# Patient Record
Sex: FEMALE | Race: WHITE | NOT HISPANIC OR LATINO | Employment: OTHER | URBAN - METROPOLITAN AREA
[De-identification: names, ages, dates, MRNs, and addresses within clinical notes are randomized per-mention and may not be internally consistent; named-entity substitution may affect disease eponyms.]

---

## 2018-02-09 LAB — CRC RECOMMENDATION EXT: NORMAL

## 2018-06-04 ENCOUNTER — HISTORICAL (OUTPATIENT)
Dept: LAB | Facility: HOSPITAL | Age: 58
End: 2018-06-04

## 2018-06-04 LAB
ABS NEUT (OLG): 4.4 X10(3)/MCL (ref 2.1–9.2)
ALBUMIN SERPL-MCNC: 3.6 GM/DL (ref 3.4–5)
ALBUMIN/GLOB SERPL: 1 RATIO (ref 1.1–2)
ALP SERPL-CCNC: 80 UNIT/L (ref 46–116)
ALT SERPL-CCNC: 38 UNIT/L (ref 12–78)
AST SERPL-CCNC: 27 UNIT/L (ref 15–37)
BASOPHILS NFR BLD AUTO: 1 % (ref 0–2)
BILIRUB SERPL-MCNC: 0.4 MG/DL (ref 0.2–1)
BILIRUBIN DIRECT+TOT PNL SERPL-MCNC: 0.13 MG/DL (ref 0–0.2)
BILIRUBIN DIRECT+TOT PNL SERPL-MCNC: 0.27 MG/DL (ref 0–0.8)
BUN SERPL-MCNC: 12.4 MG/DL (ref 7–18)
CALCIUM SERPL-MCNC: 9.1 MG/DL (ref 8.5–10.1)
CHLORIDE SERPL-SCNC: 104 MMOL/L (ref 98–107)
CHOLEST SERPL-MCNC: 215 MG/DL (ref 0–200)
CHOLEST/HDLC SERPL: 3.3 {RATIO} (ref 0–4)
CO2 SERPL-SCNC: 29.4 MMOL/L (ref 21–32)
CREAT SERPL-MCNC: 0.68 MG/DL (ref 0.6–1.3)
DEPRECATED CALCIDIOL+CALCIFEROL SERPL-MC: 15 NG/ML (ref 30–80)
EOSINOPHIL # BLD AUTO: 0.3 X10(3)/MCL
EOSINOPHIL NFR BLD AUTO: 4 %
ERYTHROCYTE [DISTWIDTH] IN BLOOD BY AUTOMATED COUNT: 13.6 % (ref 11.5–17)
GLOBULIN SER-MCNC: 3.5 GM/DL (ref 2.4–3.5)
GLUCOSE SERPL-MCNC: 92 MG/DL (ref 74–106)
HCT VFR BLD AUTO: 38.9 % (ref 37–47)
HDLC SERPL-MCNC: 65 MG/DL (ref 40–60)
HGB BLD-MCNC: 13 GM/DL (ref 12–16)
LDLC SERPL CALC-MCNC: 118 MG/DL (ref 0–129)
LYMPHOCYTES # BLD AUTO: 1.9 X10(3)/MCL
LYMPHOCYTES NFR BLD AUTO: 26 % (ref 13–40)
MCH RBC QN AUTO: 31.8 PG (ref 27–31)
MCHC RBC AUTO-ENTMCNC: 33.4 GM/DL (ref 33–36)
MCV RBC AUTO: 95.2 FL (ref 80–94)
MONOCYTES # BLD AUTO: 0.6 X10(3)/MCL
MONOCYTES NFR BLD AUTO: 9 % (ref 2–11)
NEUTROPHILS # BLD AUTO: 4.4 X10(3)/MCL (ref 2.1–9.2)
NEUTROPHILS NFR BLD AUTO: 61 % (ref 47–80)
PLATELET # BLD AUTO: 339 X10(3)/MCL (ref 130–400)
PMV BLD AUTO: 8.4 FL (ref 7.4–10.4)
POTASSIUM SERPL-SCNC: 4.3 MMOL/L (ref 3.5–5.1)
PROT SERPL-MCNC: 7.1 GM/DL (ref 6.4–8.2)
RBC # BLD AUTO: 4.09 X10(6)/MCL (ref 4.2–5.4)
SODIUM SERPL-SCNC: 141 MMOL/L (ref 136–145)
T4 FREE SERPL-MCNC: 0.89 NG/DL (ref 0.76–1.46)
TRIGL SERPL-MCNC: 158 MG/DL
TSH SERPL-ACNC: 2.08 MIU/ML (ref 0.36–3.74)
VLDLC SERPL CALC-MCNC: 32 MG/DL
WBC # SPEC AUTO: 7.2 X10(3)/MCL (ref 4.5–11.5)

## 2018-11-26 ENCOUNTER — HISTORICAL (OUTPATIENT)
Dept: LAB | Facility: HOSPITAL | Age: 58
End: 2018-11-26

## 2018-11-26 LAB
CHOLEST SERPL-MCNC: 209 MG/DL (ref 0–200)
CHOLEST/HDLC SERPL: 3.5 {RATIO} (ref 0–4)
DEPRECATED CALCIDIOL+CALCIFEROL SERPL-MC: 33.71 NG/ML (ref 30–80)
HDLC SERPL-MCNC: 60 MG/DL (ref 40–60)
LDLC SERPL CALC-MCNC: 123 MG/DL (ref 0–129)
TRIGL SERPL-MCNC: 132 MG/DL
TSH SERPL-ACNC: 2.77 MIU/ML (ref 0.36–3.74)
VLDLC SERPL CALC-MCNC: 26 MG/DL

## 2019-01-15 ENCOUNTER — HISTORICAL (OUTPATIENT)
Dept: PHYSICAL THERAPY | Facility: HOSPITAL | Age: 59
End: 2019-01-15

## 2019-01-18 ENCOUNTER — HISTORICAL (OUTPATIENT)
Dept: PHYSICAL THERAPY | Facility: HOSPITAL | Age: 59
End: 2019-01-18

## 2019-01-21 ENCOUNTER — HISTORICAL (OUTPATIENT)
Dept: PHYSICAL THERAPY | Facility: HOSPITAL | Age: 59
End: 2019-01-21

## 2019-01-23 ENCOUNTER — HISTORICAL (OUTPATIENT)
Dept: PHYSICAL THERAPY | Facility: HOSPITAL | Age: 59
End: 2019-01-23

## 2019-01-25 ENCOUNTER — HISTORICAL (OUTPATIENT)
Dept: PHYSICAL THERAPY | Facility: HOSPITAL | Age: 59
End: 2019-01-25

## 2019-01-28 ENCOUNTER — HISTORICAL (OUTPATIENT)
Dept: PHYSICAL THERAPY | Facility: HOSPITAL | Age: 59
End: 2019-01-28

## 2019-01-30 ENCOUNTER — HISTORICAL (OUTPATIENT)
Dept: PHYSICAL THERAPY | Facility: HOSPITAL | Age: 59
End: 2019-01-30

## 2019-02-01 ENCOUNTER — HISTORICAL (OUTPATIENT)
Dept: PHYSICAL THERAPY | Facility: HOSPITAL | Age: 59
End: 2019-02-01

## 2019-02-04 ENCOUNTER — HISTORICAL (OUTPATIENT)
Dept: PHYSICAL THERAPY | Facility: HOSPITAL | Age: 59
End: 2019-02-04

## 2019-02-06 ENCOUNTER — HISTORICAL (OUTPATIENT)
Dept: PHYSICAL THERAPY | Facility: HOSPITAL | Age: 59
End: 2019-02-06

## 2019-02-08 ENCOUNTER — HISTORICAL (OUTPATIENT)
Dept: PHYSICAL THERAPY | Facility: HOSPITAL | Age: 59
End: 2019-02-08

## 2019-02-11 ENCOUNTER — HISTORICAL (OUTPATIENT)
Dept: PHYSICAL THERAPY | Facility: HOSPITAL | Age: 59
End: 2019-02-11

## 2019-02-13 ENCOUNTER — HISTORICAL (OUTPATIENT)
Dept: PHYSICAL THERAPY | Facility: HOSPITAL | Age: 59
End: 2019-02-13

## 2019-02-15 ENCOUNTER — HISTORICAL (OUTPATIENT)
Dept: PHYSICAL THERAPY | Facility: HOSPITAL | Age: 59
End: 2019-02-15

## 2019-02-18 ENCOUNTER — HISTORICAL (OUTPATIENT)
Dept: PHYSICAL THERAPY | Facility: HOSPITAL | Age: 59
End: 2019-02-18

## 2019-02-22 ENCOUNTER — HISTORICAL (OUTPATIENT)
Dept: PHYSICAL THERAPY | Facility: HOSPITAL | Age: 59
End: 2019-02-22

## 2019-02-25 ENCOUNTER — HISTORICAL (OUTPATIENT)
Dept: PHYSICAL THERAPY | Facility: HOSPITAL | Age: 59
End: 2019-02-25

## 2019-03-01 ENCOUNTER — HISTORICAL (OUTPATIENT)
Dept: PHYSICAL THERAPY | Facility: HOSPITAL | Age: 59
End: 2019-03-01

## 2019-03-04 ENCOUNTER — HISTORICAL (OUTPATIENT)
Dept: PHYSICAL THERAPY | Facility: HOSPITAL | Age: 59
End: 2019-03-04

## 2019-03-08 ENCOUNTER — HISTORICAL (OUTPATIENT)
Dept: PHYSICAL THERAPY | Facility: HOSPITAL | Age: 59
End: 2019-03-08

## 2019-03-11 ENCOUNTER — HISTORICAL (OUTPATIENT)
Dept: PHYSICAL THERAPY | Facility: HOSPITAL | Age: 59
End: 2019-03-11

## 2019-03-15 ENCOUNTER — HISTORICAL (OUTPATIENT)
Dept: PHYSICAL THERAPY | Facility: HOSPITAL | Age: 59
End: 2019-03-15

## 2019-03-18 ENCOUNTER — HISTORICAL (OUTPATIENT)
Dept: PHYSICAL THERAPY | Facility: HOSPITAL | Age: 59
End: 2019-03-18

## 2019-03-22 ENCOUNTER — HISTORICAL (OUTPATIENT)
Dept: PHYSICAL THERAPY | Facility: HOSPITAL | Age: 59
End: 2019-03-22

## 2019-03-25 ENCOUNTER — HISTORICAL (OUTPATIENT)
Dept: PHYSICAL THERAPY | Facility: HOSPITAL | Age: 59
End: 2019-03-25

## 2019-04-24 ENCOUNTER — HISTORICAL (OUTPATIENT)
Dept: PHYSICAL THERAPY | Facility: HOSPITAL | Age: 59
End: 2019-04-24

## 2019-04-26 ENCOUNTER — HISTORICAL (OUTPATIENT)
Dept: PHYSICAL THERAPY | Facility: HOSPITAL | Age: 59
End: 2019-04-26

## 2019-04-30 ENCOUNTER — HISTORICAL (OUTPATIENT)
Dept: PHYSICAL THERAPY | Facility: HOSPITAL | Age: 59
End: 2019-04-30

## 2019-05-02 ENCOUNTER — HISTORICAL (OUTPATIENT)
Dept: PHYSICAL THERAPY | Facility: HOSPITAL | Age: 59
End: 2019-05-02

## 2019-05-06 ENCOUNTER — HISTORICAL (OUTPATIENT)
Dept: PHYSICAL THERAPY | Facility: HOSPITAL | Age: 59
End: 2019-05-06

## 2019-05-09 ENCOUNTER — HISTORICAL (OUTPATIENT)
Dept: PHYSICAL THERAPY | Facility: HOSPITAL | Age: 59
End: 2019-05-09

## 2019-05-30 ENCOUNTER — HISTORICAL (OUTPATIENT)
Dept: LAB | Facility: HOSPITAL | Age: 59
End: 2019-05-30

## 2019-05-30 LAB
ABS NEUT (OLG): 2.33 X10(3)/MCL (ref 2.1–9.2)
ALBUMIN SERPL-MCNC: 3.4 GM/DL (ref 3.4–5)
ALBUMIN/GLOB SERPL: 1 RATIO (ref 1.1–2)
ALP SERPL-CCNC: 65 UNIT/L (ref 46–116)
ALT SERPL-CCNC: 30 UNIT/L (ref 12–78)
AST SERPL-CCNC: 23 UNIT/L (ref 15–37)
BASOPHILS # BLD AUTO: 0 X10(3)/MCL (ref 0–0.2)
BASOPHILS NFR BLD AUTO: 0 %
BILIRUB SERPL-MCNC: 0.4 MG/DL (ref 0.2–1)
BILIRUBIN DIRECT+TOT PNL SERPL-MCNC: 0.14 MG/DL (ref 0–0.2)
BILIRUBIN DIRECT+TOT PNL SERPL-MCNC: 0.26 MG/DL (ref 0–0.8)
BUN SERPL-MCNC: 10.9 MG/DL (ref 7–18)
CALCIUM SERPL-MCNC: 8.9 MG/DL (ref 8.5–10.1)
CHLORIDE SERPL-SCNC: 106 MMOL/L (ref 98–107)
CHOLEST SERPL-MCNC: 162 MG/DL (ref 0–200)
CHOLEST/HDLC SERPL: 3.4 {RATIO} (ref 0–4)
CO2 SERPL-SCNC: 28.2 MMOL/L (ref 21–32)
CREAT SERPL-MCNC: 0.7 MG/DL (ref 0.6–1.3)
DEPRECATED CALCIDIOL+CALCIFEROL SERPL-MC: 23.86 NG/ML (ref 30–80)
EOSINOPHIL # BLD AUTO: 0.2 X10(3)/MCL (ref 0–0.9)
EOSINOPHIL NFR BLD AUTO: 4 %
ERYTHROCYTE [DISTWIDTH] IN BLOOD BY AUTOMATED COUNT: 13 % (ref 11.5–17)
GLOBULIN SER-MCNC: 3.3 GM/DL (ref 2.4–3.5)
GLUCOSE SERPL-MCNC: 92 MG/DL (ref 74–106)
HCT VFR BLD AUTO: 37 % (ref 37–47)
HDLC SERPL-MCNC: 48 MG/DL (ref 40–60)
HGB BLD-MCNC: 12.6 GM/DL (ref 12–16)
IMM GRANULOCYTES # BLD AUTO: 0.01 % (ref 0–0.02)
IMM GRANULOCYTES NFR BLD AUTO: 0.2 % (ref 0–0.43)
LDLC SERPL CALC-MCNC: 91 MG/DL (ref 0–129)
LYMPHOCYTES # BLD AUTO: 1.3 X10(3)/MCL (ref 0.6–4.6)
LYMPHOCYTES NFR BLD AUTO: 28 %
MCH RBC QN AUTO: 32.5 PG (ref 27–31)
MCHC RBC AUTO-ENTMCNC: 34.1 GM/DL (ref 33–36)
MCV RBC AUTO: 95.4 FL (ref 80–94)
MONOCYTES # BLD AUTO: 0.7 X10(3)/MCL (ref 0.1–1.3)
MONOCYTES NFR BLD AUTO: 15 %
NEUTROPHILS # BLD AUTO: 2.33 X10(3)/MCL (ref 1.4–7.9)
NEUTROPHILS NFR BLD AUTO: 52 %
PLATELET # BLD AUTO: 315 X10(3)/MCL (ref 130–400)
PMV BLD AUTO: 9.6 FL (ref 9.4–12.4)
POTASSIUM SERPL-SCNC: 4.4 MMOL/L (ref 3.5–5.1)
PROT SERPL-MCNC: 6.7 GM/DL (ref 6.4–8.2)
RBC # BLD AUTO: 3.88 X10(6)/MCL (ref 4.2–5.4)
SODIUM SERPL-SCNC: 143 MMOL/L (ref 136–145)
TRIGL SERPL-MCNC: 117 MG/DL
TSH SERPL-ACNC: 2.98 MIU/ML (ref 0.36–3.74)
VLDLC SERPL CALC-MCNC: 23 MG/DL
WBC # SPEC AUTO: 4.5 X10(3)/MCL (ref 4.5–11.5)

## 2019-11-27 ENCOUNTER — HISTORICAL (OUTPATIENT)
Dept: LAB | Facility: HOSPITAL | Age: 59
End: 2019-11-27

## 2019-11-27 LAB
ABS NEUT (OLG): 4.19 X10(3)/MCL (ref 2.1–9.2)
ALBUMIN SERPL-MCNC: 3.7 GM/DL (ref 3.4–5)
ALBUMIN/GLOB SERPL: 1.1 RATIO (ref 1.1–2)
ALP SERPL-CCNC: 81 UNIT/L (ref 46–116)
ALT SERPL-CCNC: 31 UNIT/L (ref 12–78)
AST SERPL-CCNC: 22 UNIT/L (ref 15–37)
BASOPHILS # BLD AUTO: 0 X10(3)/MCL (ref 0–0.2)
BASOPHILS NFR BLD AUTO: 0 %
BILIRUB SERPL-MCNC: 0.7 MG/DL (ref 0.2–1)
BILIRUBIN DIRECT+TOT PNL SERPL-MCNC: 0.2 MG/DL (ref 0–0.2)
BILIRUBIN DIRECT+TOT PNL SERPL-MCNC: 0.5 MG/DL (ref 0–0.8)
BUN SERPL-MCNC: 15.6 MG/DL (ref 7–18)
CALCIUM SERPL-MCNC: 9.6 MG/DL (ref 8.5–10.1)
CHLORIDE SERPL-SCNC: 104 MMOL/L (ref 98–107)
CHOLEST SERPL-MCNC: 198 MG/DL (ref 0–200)
CHOLEST/HDLC SERPL: 2.9 {RATIO} (ref 0–4)
CO2 SERPL-SCNC: 30.2 MMOL/L (ref 21–32)
CREAT SERPL-MCNC: 0.75 MG/DL (ref 0.6–1.3)
DEPRECATED CALCIDIOL+CALCIFEROL SERPL-MC: 34.87 NG/ML (ref 30–80)
EOSINOPHIL # BLD AUTO: 0.2 X10(3)/MCL (ref 0–0.9)
EOSINOPHIL NFR BLD AUTO: 3 %
ERYTHROCYTE [DISTWIDTH] IN BLOOD BY AUTOMATED COUNT: 13.1 % (ref 11.5–17)
GLOBULIN SER-MCNC: 3.5 GM/DL (ref 2.4–3.5)
GLUCOSE SERPL-MCNC: 89 MG/DL (ref 74–106)
HCT VFR BLD AUTO: 39.1 % (ref 37–47)
HDLC SERPL-MCNC: 69 MG/DL (ref 40–60)
HGB BLD-MCNC: 12.3 GM/DL (ref 12–16)
IMM GRANULOCYTES # BLD AUTO: 0.01 % (ref 0–0.02)
IMM GRANULOCYTES NFR BLD AUTO: 0.1 % (ref 0–0.43)
LDLC SERPL CALC-MCNC: 103 MG/DL (ref 0–129)
LYMPHOCYTES # BLD AUTO: 2 X10(3)/MCL (ref 0.6–4.6)
LYMPHOCYTES NFR BLD AUTO: 28 %
MCH RBC QN AUTO: 31.5 PG (ref 27–31)
MCHC RBC AUTO-ENTMCNC: 31.5 GM/DL (ref 33–36)
MCV RBC AUTO: 100.3 FL (ref 80–94)
MONOCYTES # BLD AUTO: 0.7 X10(3)/MCL (ref 0.1–1.3)
MONOCYTES NFR BLD AUTO: 10 %
NEUTROPHILS # BLD AUTO: 4.19 X10(3)/MCL (ref 1.4–7.9)
NEUTROPHILS NFR BLD AUTO: 59 %
PLATELET # BLD AUTO: 391 X10(3)/MCL (ref 130–400)
PMV BLD AUTO: 9.7 FL (ref 9.4–12.4)
POTASSIUM SERPL-SCNC: 4.6 MMOL/L (ref 3.5–5.1)
PROT SERPL-MCNC: 7.2 GM/DL (ref 6.4–8.2)
RBC # BLD AUTO: 3.9 X10(6)/MCL (ref 4.2–5.4)
SODIUM SERPL-SCNC: 142 MMOL/L (ref 136–145)
TRIGL SERPL-MCNC: 128 MG/DL
TSH SERPL-ACNC: 3.31 MIU/ML (ref 0.36–3.74)
VLDLC SERPL CALC-MCNC: 26 MG/DL
WBC # SPEC AUTO: 7.1 X10(3)/MCL (ref 4.5–11.5)

## 2020-05-26 ENCOUNTER — HISTORICAL (OUTPATIENT)
Dept: LAB | Facility: HOSPITAL | Age: 60
End: 2020-05-26

## 2020-05-26 LAB
CHOLEST SERPL-MCNC: 220 MG/DL (ref 0–200)
CHOLEST/HDLC SERPL: 3.6 {RATIO} (ref 0–4)
HDLC SERPL-MCNC: 61 MG/DL (ref 40–60)
LDLC SERPL CALC-MCNC: 134 MG/DL (ref 0–129)
TRIGL SERPL-MCNC: 123 MG/DL
TSH SERPL-ACNC: 2.94 MIU/ML (ref 0.36–3.74)
VLDLC SERPL CALC-MCNC: 25 MG/DL

## 2020-12-03 ENCOUNTER — HISTORICAL (OUTPATIENT)
Dept: LAB | Facility: HOSPITAL | Age: 60
End: 2020-12-03

## 2020-12-03 LAB
ABS NEUT (OLG): 4.76 X10(3)/MCL (ref 2.1–9.2)
ALBUMIN SERPL-MCNC: 3.7 GM/DL (ref 3.4–4.8)
ALBUMIN/GLOB SERPL: 1.2 RATIO (ref 1.1–2)
ALP SERPL-CCNC: 69 UNIT/L (ref 40–150)
ALT SERPL-CCNC: 22 UNIT/L (ref 0–55)
AST SERPL-CCNC: 20 UNIT/L (ref 5–34)
BASOPHILS # BLD AUTO: 0 X10(3)/MCL (ref 0–0.2)
BASOPHILS NFR BLD AUTO: 1 %
BILIRUB SERPL-MCNC: 0.5 MG/DL
BILIRUBIN DIRECT+TOT PNL SERPL-MCNC: 0.2 MG/DL (ref 0–0.5)
BILIRUBIN DIRECT+TOT PNL SERPL-MCNC: 0.3 MG/DL (ref 0–0.8)
BUN SERPL-MCNC: 18.6 MG/DL (ref 9.8–20.1)
CALCIUM SERPL-MCNC: 9.1 MG/DL (ref 8.4–10.2)
CHLORIDE SERPL-SCNC: 103 MMOL/L (ref 98–107)
CHOLEST SERPL-MCNC: 192 MG/DL
CHOLEST/HDLC SERPL: 3 {RATIO} (ref 0–5)
CO2 SERPL-SCNC: 29 MMOL/L (ref 23–31)
CREAT SERPL-MCNC: 0.72 MG/DL (ref 0.55–1.02)
DEPRECATED CALCIDIOL+CALCIFEROL SERPL-MC: 46.5 NG/ML (ref 30–80)
EOSINOPHIL # BLD AUTO: 0.3 X10(3)/MCL (ref 0–0.9)
EOSINOPHIL NFR BLD AUTO: 4 %
ERYTHROCYTE [DISTWIDTH] IN BLOOD BY AUTOMATED COUNT: 12.5 % (ref 11.5–17)
EST. AVERAGE GLUCOSE BLD GHB EST-MCNC: 93.9 MG/DL
GLOBULIN SER-MCNC: 3.2 GM/DL (ref 2.4–3.5)
GLUCOSE SERPL-MCNC: 92 MG/DL (ref 82–115)
HBA1C MFR BLD: 4.9 %
HCT VFR BLD AUTO: 38.2 % (ref 37–47)
HDLC SERPL-MCNC: 60 MG/DL (ref 35–60)
HGB BLD-MCNC: 11.9 GM/DL (ref 12–16)
IMM GRANULOCYTES # BLD AUTO: 0.01 % (ref 0–0.02)
IMM GRANULOCYTES NFR BLD AUTO: 0.1 % (ref 0–0.43)
LDLC SERPL CALC-MCNC: 108 MG/DL (ref 50–140)
LYMPHOCYTES # BLD AUTO: 2.2 X10(3)/MCL (ref 0.6–4.6)
LYMPHOCYTES NFR BLD AUTO: 28 %
MCH RBC QN AUTO: 31.6 PG (ref 27–31)
MCHC RBC AUTO-ENTMCNC: 31.2 GM/DL (ref 33–36)
MCV RBC AUTO: 101.6 FL (ref 80–94)
MONOCYTES # BLD AUTO: 0.6 X10(3)/MCL (ref 0.1–1.3)
MONOCYTES NFR BLD AUTO: 7 %
NEUTROPHILS # BLD AUTO: 4.76 X10(3)/MCL (ref 1.4–7.9)
NEUTROPHILS NFR BLD AUTO: 61 %
PLATELET # BLD AUTO: 400 X10(3)/MCL (ref 130–400)
PMV BLD AUTO: 10 FL (ref 9.4–12.4)
POTASSIUM SERPL-SCNC: 4.2 MMOL/L (ref 3.5–5.1)
PROT SERPL-MCNC: 6.9 GM/DL (ref 5.8–7.6)
RBC # BLD AUTO: 3.76 X10(6)/MCL (ref 4.2–5.4)
SODIUM SERPL-SCNC: 139 MMOL/L (ref 136–145)
TRIGL SERPL-MCNC: 122 MG/DL (ref 37–140)
TSH SERPL-ACNC: 3.58 UIU/ML (ref 0.35–4.94)
VIT B12 SERPL-MCNC: 904 PG/ML (ref 213–816)
VLDLC SERPL CALC-MCNC: 24 MG/DL
WBC # SPEC AUTO: 7.8 X10(3)/MCL (ref 4.5–11.5)

## 2021-06-04 ENCOUNTER — HISTORICAL (OUTPATIENT)
Dept: LAB | Facility: HOSPITAL | Age: 61
End: 2021-06-04

## 2021-06-04 LAB
ABS NEUT (OLG): 5.17 X10(3)/MCL (ref 2.1–9.2)
ALBUMIN SERPL-MCNC: 3.6 GM/DL (ref 3.4–4.8)
ALBUMIN/GLOB SERPL: 1.2 RATIO (ref 1.1–2)
ALP SERPL-CCNC: 69 UNIT/L (ref 40–150)
ALT SERPL-CCNC: 24 UNIT/L (ref 0–55)
AST SERPL-CCNC: 25 UNIT/L (ref 5–34)
BASOPHILS # BLD AUTO: 0 X10(3)/MCL (ref 0–0.2)
BASOPHILS NFR BLD AUTO: 1 %
BILIRUB SERPL-MCNC: 0.3 MG/DL
BILIRUBIN DIRECT+TOT PNL SERPL-MCNC: 0.1 MG/DL (ref 0–0.5)
BILIRUBIN DIRECT+TOT PNL SERPL-MCNC: 0.2 MG/DL (ref 0–0.8)
BUN SERPL-MCNC: 24.1 MG/DL (ref 9.8–20.1)
CALCIUM SERPL-MCNC: 8.7 MG/DL (ref 8.4–10.2)
CHLORIDE SERPL-SCNC: 104 MMOL/L (ref 98–107)
CHOLEST SERPL-MCNC: 198 MG/DL
CHOLEST/HDLC SERPL: 4 {RATIO} (ref 0–5)
CO2 SERPL-SCNC: 28 MMOL/L (ref 23–31)
CREAT SERPL-MCNC: 0.78 MG/DL (ref 0.55–1.02)
DEPRECATED CALCIDIOL+CALCIFEROL SERPL-MC: 43.4 NG/ML (ref 30–80)
EOSINOPHIL # BLD AUTO: 0.4 X10(3)/MCL (ref 0–0.9)
EOSINOPHIL NFR BLD AUTO: 4 %
ERYTHROCYTE [DISTWIDTH] IN BLOOD BY AUTOMATED COUNT: 13 % (ref 11.5–17)
EST. AVERAGE GLUCOSE BLD GHB EST-MCNC: 96.8 MG/DL
GLOBULIN SER-MCNC: 3.1 GM/DL (ref 2.4–3.5)
GLUCOSE SERPL-MCNC: 96 MG/DL (ref 82–115)
HBA1C MFR BLD: 5 %
HCT VFR BLD AUTO: 35.9 % (ref 37–47)
HDLC SERPL-MCNC: 56 MG/DL (ref 35–60)
HGB BLD-MCNC: 11.3 GM/DL (ref 12–16)
IMM GRANULOCYTES # BLD AUTO: 0.01 % (ref 0–0.02)
IMM GRANULOCYTES NFR BLD AUTO: 0.1 % (ref 0–0.43)
LDLC SERPL CALC-MCNC: 105 MG/DL (ref 50–140)
LYMPHOCYTES # BLD AUTO: 2.2 X10(3)/MCL (ref 0.6–4.6)
LYMPHOCYTES NFR BLD AUTO: 26 %
MCH RBC QN AUTO: 31.8 PG (ref 27–31)
MCHC RBC AUTO-ENTMCNC: 31.5 GM/DL (ref 33–36)
MCV RBC AUTO: 101.1 FL (ref 80–94)
MONOCYTES # BLD AUTO: 0.8 X10(3)/MCL (ref 0.1–1.3)
MONOCYTES NFR BLD AUTO: 9 %
NEUTROPHILS # BLD AUTO: 5.17 X10(3)/MCL (ref 1.4–7.9)
NEUTROPHILS NFR BLD AUTO: 60 %
PLATELET # BLD AUTO: 385 X10(3)/MCL (ref 130–400)
PMV BLD AUTO: 9.4 FL (ref 9.4–12.4)
POTASSIUM SERPL-SCNC: 4.4 MMOL/L (ref 3.5–5.1)
PROT SERPL-MCNC: 6.7 GM/DL (ref 5.8–7.6)
RBC # BLD AUTO: 3.55 X10(6)/MCL (ref 4.2–5.4)
SODIUM SERPL-SCNC: 139 MMOL/L (ref 136–145)
TRIGL SERPL-MCNC: 186 MG/DL (ref 37–140)
TSH SERPL-ACNC: 4.12 UIU/ML (ref 0.35–4.94)
VLDLC SERPL CALC-MCNC: 37 MG/DL
WBC # SPEC AUTO: 8.6 X10(3)/MCL (ref 4.5–11.5)

## 2021-07-28 LAB — BCS RECOMMENDATION EXT: NORMAL

## 2021-09-14 ENCOUNTER — HISTORICAL (OUTPATIENT)
Dept: LAB | Facility: HOSPITAL | Age: 61
End: 2021-09-14

## 2021-09-14 LAB — SARS-COV-2 AG RESP QL IA.RAPID: NEGATIVE

## 2022-04-24 ENCOUNTER — HISTORICAL (OUTPATIENT)
Dept: ADMINISTRATIVE | Facility: HOSPITAL | Age: 62
End: 2022-04-24

## 2022-05-02 LAB
HUMAN PAPILLOMAVIRUS (HPV): NORMAL
PAP RECOMMENDATION EXT: NORMAL
PAP SMEAR: NORMAL

## 2022-06-02 DIAGNOSIS — Z00.00 WELLNESS EXAMINATION: Primary | ICD-10-CM

## 2022-06-06 ENCOUNTER — LAB VISIT (OUTPATIENT)
Dept: LAB | Facility: HOSPITAL | Age: 62
End: 2022-06-06
Attending: NURSE PRACTITIONER
Payer: MEDICAID

## 2022-06-06 DIAGNOSIS — Z00.00 WELLNESS EXAMINATION: ICD-10-CM

## 2022-06-06 LAB
ALBUMIN SERPL-MCNC: 3.4 GM/DL (ref 3.4–4.8)
ALBUMIN/GLOB SERPL: 1.1 RATIO (ref 1.1–2)
ALP SERPL-CCNC: 74 UNIT/L (ref 40–150)
ALT SERPL-CCNC: 15 UNIT/L (ref 0–55)
AST SERPL-CCNC: 18 UNIT/L (ref 5–34)
BASOPHILS # BLD AUTO: 0.04 X10(3)/MCL (ref 0–0.2)
BASOPHILS NFR BLD AUTO: 0.5 %
BILIRUBIN DIRECT+TOT PNL SERPL-MCNC: 0.3 MG/DL
BUN SERPL-MCNC: 25.9 MG/DL (ref 9.8–20.1)
CALCIUM SERPL-MCNC: 8.9 MG/DL (ref 8.4–10.2)
CHLORIDE SERPL-SCNC: 108 MMOL/L (ref 98–107)
CHOLEST SERPL-MCNC: 178 MG/DL
CHOLEST/HDLC SERPL: 3 {RATIO} (ref 0–5)
CO2 SERPL-SCNC: 26 MMOL/L (ref 23–31)
CREAT SERPL-MCNC: 0.68 MG/DL (ref 0.55–1.02)
DEPRECATED CALCIDIOL+CALCIFEROL SERPL-MC: 36.8 NG/ML (ref 30–80)
EOSINOPHIL # BLD AUTO: 0.27 X10(3)/MCL (ref 0–0.9)
EOSINOPHIL NFR BLD AUTO: 3.1 %
ERYTHROCYTE [DISTWIDTH] IN BLOOD BY AUTOMATED COUNT: 12.8 % (ref 11.5–17)
EST. AVERAGE GLUCOSE BLD GHB EST-MCNC: 93.9 MG/DL
GLOBULIN SER-MCNC: 3.1 GM/DL (ref 2.4–3.5)
GLUCOSE SERPL-MCNC: 98 MG/DL (ref 82–115)
HBA1C MFR BLD: 4.9 %
HCT VFR BLD AUTO: 36 % (ref 37–47)
HDLC SERPL-MCNC: 54 MG/DL (ref 35–60)
HGB BLD-MCNC: 11.1 GM/DL (ref 12–16)
IMM GRANULOCYTES # BLD AUTO: 0.01 X10(3)/MCL (ref 0–0.02)
IMM GRANULOCYTES NFR BLD AUTO: 0.1 % (ref 0–0.43)
LDLC SERPL CALC-MCNC: 103 MG/DL (ref 50–140)
LYMPHOCYTES # BLD AUTO: 2.06 X10(3)/MCL (ref 0.6–4.6)
LYMPHOCYTES NFR BLD AUTO: 23.9 %
MCH RBC QN AUTO: 30.8 PG (ref 27–31)
MCHC RBC AUTO-ENTMCNC: 30.8 MG/DL (ref 33–36)
MCV RBC AUTO: 100 FL (ref 80–94)
MONOCYTES # BLD AUTO: 0.81 X10(3)/MCL (ref 0.1–1.3)
MONOCYTES NFR BLD AUTO: 9.4 %
NEUTROPHILS # BLD AUTO: 5.4 X10(3)/MCL (ref 2.1–9.2)
NEUTROPHILS NFR BLD AUTO: 63 %
PLATELET # BLD AUTO: 364 X10(3)/MCL (ref 130–400)
PMV BLD AUTO: 9.5 FL (ref 9.4–12.4)
POTASSIUM SERPL-SCNC: 4.4 MMOL/L (ref 3.5–5.1)
PROT SERPL-MCNC: 6.5 GM/DL (ref 5.8–7.6)
RBC # BLD AUTO: 3.6 X10(6)/MCL (ref 4.2–5.4)
SODIUM SERPL-SCNC: 142 MMOL/L (ref 136–145)
TRIGL SERPL-MCNC: 103 MG/DL (ref 37–140)
TSH SERPL-ACNC: 3.79 UIU/ML (ref 0.35–4.94)
VLDLC SERPL CALC-MCNC: 21 MG/DL
WBC # SPEC AUTO: 8.6 X10(3)/MCL (ref 4.5–11.5)

## 2022-06-06 PROCEDURE — 36415 COLL VENOUS BLD VENIPUNCTURE: CPT

## 2022-06-06 PROCEDURE — 80061 LIPID PANEL: CPT

## 2022-06-06 PROCEDURE — 84443 ASSAY THYROID STIM HORMONE: CPT

## 2022-06-06 PROCEDURE — 80053 COMPREHEN METABOLIC PANEL: CPT

## 2022-06-06 PROCEDURE — 83036 HEMOGLOBIN GLYCOSYLATED A1C: CPT

## 2022-06-06 PROCEDURE — 82306 VITAMIN D 25 HYDROXY: CPT

## 2022-06-06 PROCEDURE — 85025 COMPLETE CBC W/AUTO DIFF WBC: CPT

## 2022-06-08 ENCOUNTER — OFFICE VISIT (OUTPATIENT)
Dept: FAMILY MEDICINE | Facility: CLINIC | Age: 62
End: 2022-06-08
Payer: MEDICAID

## 2022-06-08 VITALS
DIASTOLIC BLOOD PRESSURE: 83 MMHG | TEMPERATURE: 98 F | BODY MASS INDEX: 31.83 KG/M2 | HEIGHT: 62 IN | OXYGEN SATURATION: 99 % | HEART RATE: 70 BPM | WEIGHT: 173 LBS | SYSTOLIC BLOOD PRESSURE: 138 MMHG | RESPIRATION RATE: 18 BRPM

## 2022-06-08 DIAGNOSIS — M54.32 SCIATICA OF LEFT SIDE: ICD-10-CM

## 2022-06-08 DIAGNOSIS — K21.9 GASTROESOPHAGEAL REFLUX DISEASE, UNSPECIFIED WHETHER ESOPHAGITIS PRESENT: ICD-10-CM

## 2022-06-08 DIAGNOSIS — F32.A DEPRESSION, UNSPECIFIED DEPRESSION TYPE: Primary | ICD-10-CM

## 2022-06-08 DIAGNOSIS — E78.00 HYPERCHOLESTEROLEMIA: ICD-10-CM

## 2022-06-08 DIAGNOSIS — E66.9 OBESITY, UNSPECIFIED CLASSIFICATION, UNSPECIFIED OBESITY TYPE, UNSPECIFIED WHETHER SERIOUS COMORBIDITY PRESENT: ICD-10-CM

## 2022-06-08 DIAGNOSIS — E03.9 HYPOTHYROIDISM, UNSPECIFIED TYPE: ICD-10-CM

## 2022-06-08 DIAGNOSIS — Z00.00 WELLNESS EXAMINATION: ICD-10-CM

## 2022-06-08 DIAGNOSIS — D64.9 ANEMIA, UNSPECIFIED TYPE: ICD-10-CM

## 2022-06-08 PROBLEM — E07.9 DISORDER OF THYROID: Status: ACTIVE | Noted: 2022-06-08

## 2022-06-08 PROBLEM — M54.30 SCIATICA: Status: ACTIVE | Noted: 2022-06-08

## 2022-06-08 PROCEDURE — 3079F DIAST BP 80-89 MM HG: CPT | Mod: CPTII,,, | Performed by: NURSE PRACTITIONER

## 2022-06-08 PROCEDURE — 1160F PR REVIEW ALL MEDS BY PRESCRIBER/CLIN PHARMACIST DOCUMENTED: ICD-10-PCS | Mod: CPTII,,, | Performed by: NURSE PRACTITIONER

## 2022-06-08 PROCEDURE — 3008F BODY MASS INDEX DOCD: CPT | Mod: CPTII,,, | Performed by: NURSE PRACTITIONER

## 2022-06-08 PROCEDURE — 3079F PR MOST RECENT DIASTOLIC BLOOD PRESSURE 80-89 MM HG: ICD-10-PCS | Mod: CPTII,,, | Performed by: NURSE PRACTITIONER

## 2022-06-08 PROCEDURE — 99396 PREV VISIT EST AGE 40-64: CPT | Mod: ,,, | Performed by: NURSE PRACTITIONER

## 2022-06-08 PROCEDURE — 1159F MED LIST DOCD IN RCRD: CPT | Mod: CPTII,,, | Performed by: NURSE PRACTITIONER

## 2022-06-08 PROCEDURE — 1159F PR MEDICATION LIST DOCUMENTED IN MEDICAL RECORD: ICD-10-PCS | Mod: CPTII,,, | Performed by: NURSE PRACTITIONER

## 2022-06-08 PROCEDURE — 99396 PR PREVENTIVE VISIT,EST,40-64: ICD-10-PCS | Mod: ,,, | Performed by: NURSE PRACTITIONER

## 2022-06-08 PROCEDURE — 3075F SYST BP GE 130 - 139MM HG: CPT | Mod: CPTII,,, | Performed by: NURSE PRACTITIONER

## 2022-06-08 PROCEDURE — 3075F PR MOST RECENT SYSTOLIC BLOOD PRESS GE 130-139MM HG: ICD-10-PCS | Mod: CPTII,,, | Performed by: NURSE PRACTITIONER

## 2022-06-08 PROCEDURE — 1160F RVW MEDS BY RX/DR IN RCRD: CPT | Mod: CPTII,,, | Performed by: NURSE PRACTITIONER

## 2022-06-08 PROCEDURE — 3008F PR BODY MASS INDEX (BMI) DOCUMENTED: ICD-10-PCS | Mod: CPTII,,, | Performed by: NURSE PRACTITIONER

## 2022-06-08 RX ORDER — PREDNISONE 20 MG/1
20 TABLET ORAL DAILY
Qty: 5 TABLET | Refills: 0 | Status: SHIPPED | OUTPATIENT
Start: 2022-06-08 | End: 2022-06-13

## 2022-06-08 RX ORDER — FOLIC ACID 1 MG/1
1 TABLET ORAL DAILY
Qty: 30 TABLET | Refills: 11 | Status: SHIPPED | OUTPATIENT
Start: 2022-06-08 | End: 2022-12-20 | Stop reason: SDUPTHER

## 2022-06-08 RX ORDER — FERROUS SULFATE 324(65)MG
324 TABLET, DELAYED RELEASE (ENTERIC COATED) ORAL DAILY
Qty: 30 TABLET | Refills: 11 | Status: SHIPPED | OUTPATIENT
Start: 2022-06-08 | End: 2022-12-20 | Stop reason: SDUPTHER

## 2022-06-08 NOTE — PROGRESS NOTES
Subjective:       Patient ID: Leanne Hahn is a 61 y.o. female.    Chief Complaint: Annual Exam, Numbness (L hand), and Hip Pain (L hip that radiates down to leg)    This is a 61-year-old female presents to the clinic for wellness exam with lab review.    Review of Systems   Constitutional: Negative.    HENT: Negative.    Eyes: Negative.    Respiratory: Negative.    Cardiovascular: Negative.    Gastrointestinal: Negative.    Endocrine: Negative.    Genitourinary: Negative.    Musculoskeletal: Positive for arthralgias, back pain, leg pain and myalgias.   Integumentary:  Negative.   Allergic/Immunologic: Negative.    Neurological: Positive for numbness.   Hematological: Negative.    Psychiatric/Behavioral: Negative.          Objective:      Physical Exam  Constitutional:       Appearance: Normal appearance. She is obese.   HENT:      Head: Normocephalic.      Right Ear: Tympanic membrane, ear canal and external ear normal.      Left Ear: Tympanic membrane, ear canal and external ear normal.      Nose: Nose normal.      Mouth/Throat:      Mouth: Mucous membranes are moist.      Pharynx: Oropharynx is clear.   Eyes:      Extraocular Movements: Extraocular movements intact.      Conjunctiva/sclera: Conjunctivae normal.      Pupils: Pupils are equal, round, and reactive to light.   Cardiovascular:      Rate and Rhythm: Normal rate and regular rhythm.      Pulses: Normal pulses.      Heart sounds: Normal heart sounds.   Pulmonary:      Effort: Pulmonary effort is normal.      Breath sounds: Normal breath sounds.   Abdominal:      General: Bowel sounds are normal.      Palpations: Abdomen is soft.   Musculoskeletal:         General: Normal range of motion.      Cervical back: Normal range of motion and neck supple.   Skin:     General: Skin is warm and dry.   Neurological:      General: No focal deficit present.      Mental Status: She is alert and oriented to person, place, and time. Mental status is at baseline.    Psychiatric:         Mood and Affect: Mood normal.         Behavior: Behavior normal.         Thought Content: Thought content normal.         Judgment: Judgment normal.         Assessment:       Problem List Items Addressed This Visit        Psychiatric    Depression - Primary     Stable.  Continue current management.  Report to ED with any suicidal or homicidal ideations.              Cardiac/Vascular    Hypercholesterolemia     Stable.  Continue lovastatin 40 mg p.o. at bedtime.  Low-fat, low-cholesterol diet advised.  Increase physical activity to at least 30 minutes a day most days of the week as tolerated.              Oncology    Anemia     Ferrous sulfate 325 mg p.o. daily and folic acid 1 mg p.o. daily sent to pharmacy.              Endocrine    Hypothyroidism     Stable.  Continue levothyroxine 50 mcg p.o. daily.           Obesity       GI    GERD (gastroesophageal reflux disease)     Stable.  Continue Prilosec 20 mg p.o. daily.              Orthopedic    Sciatica     Prednisone 20 mg p.o. daily x5 days sent to pharmacy.              Other    Wellness examination     Healthy lifestyle discussed.  Mammogram up-to-date.  Cervical cancer screening up-to-date.  Colon cancer screening up-to-date.                 Plan:       Return to clinic in 6 months or as needed.

## 2022-06-08 NOTE — ASSESSMENT & PLAN NOTE
Stable.  Continue lovastatin 40 mg p.o. at bedtime.  Low-fat, low-cholesterol diet advised.  Increase physical activity to at least 30 minutes a day most days of the week as tolerated.

## 2022-06-08 NOTE — ASSESSMENT & PLAN NOTE
Healthy lifestyle discussed.  Mammogram up-to-date.  Cervical cancer screening up-to-date.  Colon cancer screening up-to-date.

## 2022-06-28 ENCOUNTER — OFFICE VISIT (OUTPATIENT)
Dept: FAMILY MEDICINE | Facility: CLINIC | Age: 62
End: 2022-06-28
Payer: MEDICAID

## 2022-06-28 VITALS
RESPIRATION RATE: 18 BRPM | HEART RATE: 81 BPM | DIASTOLIC BLOOD PRESSURE: 77 MMHG | TEMPERATURE: 98 F | HEIGHT: 62 IN | BODY MASS INDEX: 31.68 KG/M2 | SYSTOLIC BLOOD PRESSURE: 117 MMHG | OXYGEN SATURATION: 98 % | WEIGHT: 172.19 LBS

## 2022-06-28 DIAGNOSIS — G89.29 CHRONIC PAIN OF LEFT KNEE: ICD-10-CM

## 2022-06-28 DIAGNOSIS — M19.90 ARTHRITIS: ICD-10-CM

## 2022-06-28 DIAGNOSIS — M25.562 CHRONIC PAIN OF LEFT KNEE: ICD-10-CM

## 2022-06-28 PROCEDURE — 99213 OFFICE O/P EST LOW 20 MIN: CPT | Mod: ,,, | Performed by: NURSE PRACTITIONER

## 2022-06-28 PROCEDURE — 1160F RVW MEDS BY RX/DR IN RCRD: CPT | Mod: CPTII,,, | Performed by: NURSE PRACTITIONER

## 2022-06-28 PROCEDURE — 3078F DIAST BP <80 MM HG: CPT | Mod: CPTII,,, | Performed by: NURSE PRACTITIONER

## 2022-06-28 PROCEDURE — 3078F PR MOST RECENT DIASTOLIC BLOOD PRESSURE < 80 MM HG: ICD-10-PCS | Mod: CPTII,,, | Performed by: NURSE PRACTITIONER

## 2022-06-28 PROCEDURE — 1159F MED LIST DOCD IN RCRD: CPT | Mod: CPTII,,, | Performed by: NURSE PRACTITIONER

## 2022-06-28 PROCEDURE — 3074F SYST BP LT 130 MM HG: CPT | Mod: CPTII,,, | Performed by: NURSE PRACTITIONER

## 2022-06-28 PROCEDURE — 99213 PR OFFICE/OUTPT VISIT, EST, LEVL III, 20-29 MIN: ICD-10-PCS | Mod: ,,, | Performed by: NURSE PRACTITIONER

## 2022-06-28 PROCEDURE — 1159F PR MEDICATION LIST DOCUMENTED IN MEDICAL RECORD: ICD-10-PCS | Mod: CPTII,,, | Performed by: NURSE PRACTITIONER

## 2022-06-28 PROCEDURE — 3074F PR MOST RECENT SYSTOLIC BLOOD PRESSURE < 130 MM HG: ICD-10-PCS | Mod: CPTII,,, | Performed by: NURSE PRACTITIONER

## 2022-06-28 PROCEDURE — 1160F PR REVIEW ALL MEDS BY PRESCRIBER/CLIN PHARMACIST DOCUMENTED: ICD-10-PCS | Mod: CPTII,,, | Performed by: NURSE PRACTITIONER

## 2022-06-28 PROCEDURE — 3008F BODY MASS INDEX DOCD: CPT | Mod: CPTII,,, | Performed by: NURSE PRACTITIONER

## 2022-06-28 PROCEDURE — 3008F PR BODY MASS INDEX (BMI) DOCUMENTED: ICD-10-PCS | Mod: CPTII,,, | Performed by: NURSE PRACTITIONER

## 2022-06-28 RX ORDER — MELOXICAM 7.5 MG/1
TABLET ORAL
Qty: 30 TABLET | Refills: 11 | Status: SHIPPED | OUTPATIENT
Start: 2022-06-28 | End: 2022-12-20 | Stop reason: SDUPTHER

## 2022-06-28 RX ORDER — PREDNISONE 20 MG/1
20 TABLET ORAL DAILY
Qty: 5 TABLET | Refills: 0 | Status: SHIPPED | OUTPATIENT
Start: 2022-06-28 | End: 2022-07-03

## 2022-06-28 NOTE — PROGRESS NOTES
"Subjective:       Patient ID: Leanne Hahn is a 61 y.o. female.    Chief Complaint: Knee Pain (C/o pain to L knee. States it started approx 2 days ago. Pain is at the knee and it radiates from the knee down to the foot. States "it hurts" and the pain is constant and causes her to limp when she walks.)    This is a 61-year-old female presents to clinic with complaint of chronic left knee pain.  Patient denies any trauma or injury.  Patient rates pain 5/10 in office.  Patient denies relief with Tylenol extra-strength.  Patient reports pain as constant aching in quality.  Pain is exacerbated with movement and relieved with rest.    Review of Systems   Constitutional: Negative.    HENT: Negative.    Eyes: Negative.    Respiratory: Negative.    Cardiovascular: Negative.    Gastrointestinal: Negative.    Endocrine: Negative.    Genitourinary: Negative.    Musculoskeletal: Positive for arthralgias and leg pain.        Left knee pain with decreased ROM.   Integumentary:  Negative.   Allergic/Immunologic: Negative.    Neurological: Negative.    Hematological: Negative.    Psychiatric/Behavioral: Negative.          Objective:      Physical Exam  Constitutional:       Appearance: Normal appearance. She is obese.   HENT:      Head: Normocephalic.      Right Ear: Tympanic membrane, ear canal and external ear normal.      Left Ear: Tympanic membrane, ear canal and external ear normal.      Nose: Nose normal.      Mouth/Throat:      Mouth: Mucous membranes are moist.      Pharynx: Oropharynx is clear.   Eyes:      Extraocular Movements: Extraocular movements intact.      Conjunctiva/sclera: Conjunctivae normal.      Pupils: Pupils are equal, round, and reactive to light.   Cardiovascular:      Rate and Rhythm: Normal rate and regular rhythm.      Pulses: Normal pulses.      Heart sounds: Normal heart sounds.   Pulmonary:      Effort: Pulmonary effort is normal.      Breath sounds: Normal breath sounds.   Abdominal:      General: " Bowel sounds are normal.      Palpations: Abdomen is soft.   Musculoskeletal:         General: Tenderness present.      Cervical back: Normal range of motion and neck supple.      Comments: Left posterior knee TTP. Decreased flexion and extension of knee joint.   Skin:     General: Skin is warm and dry.   Neurological:      General: No focal deficit present.      Mental Status: She is alert and oriented to person, place, and time. Mental status is at baseline.   Psychiatric:         Mood and Affect: Mood normal.         Behavior: Behavior normal.         Thought Content: Thought content normal.         Judgment: Judgment normal.         Assessment:       Problem List Items Addressed This Visit        Orthopedic    Chronic pain of left knee     Prednisone 20 mg p.o. daily x5 days sent to pharmacy.  Instructed patient to resume meloxicam 7.5 mg p.o. daily and use Tylenol Arthritis for breakthrough pain as directed.  Patient declines ortho referral at this time.           Arthritis          Plan:   Return to clinic if symptoms do not improve.

## 2022-06-28 NOTE — ASSESSMENT & PLAN NOTE
Prednisone 20 mg p.o. daily x5 days sent to pharmacy.  Instructed patient to resume meloxicam 7.5 mg p.o. daily and use Tylenol Arthritis for breakthrough pain as directed.  Patient declines ortho referral at this time.

## 2022-07-11 RX ORDER — LEVOTHYROXINE SODIUM 50 UG/1
50 TABLET ORAL DAILY
Qty: 30 TABLET | Refills: 6 | Status: SHIPPED | OUTPATIENT
Start: 2022-07-11 | End: 2022-12-20 | Stop reason: SDUPTHER

## 2022-07-14 ENCOUNTER — OFFICE VISIT (OUTPATIENT)
Dept: FAMILY MEDICINE | Facility: CLINIC | Age: 62
End: 2022-07-14
Payer: MEDICAID

## 2022-07-14 ENCOUNTER — LAB REQUISITION (OUTPATIENT)
Dept: LAB | Facility: HOSPITAL | Age: 62
End: 2022-07-14
Attending: NURSE PRACTITIONER
Payer: MEDICAID

## 2022-07-14 VITALS
BODY MASS INDEX: 30.99 KG/M2 | OXYGEN SATURATION: 100 % | HEIGHT: 62 IN | SYSTOLIC BLOOD PRESSURE: 130 MMHG | RESPIRATION RATE: 18 BRPM | WEIGHT: 168.38 LBS | DIASTOLIC BLOOD PRESSURE: 78 MMHG | TEMPERATURE: 98 F | HEART RATE: 85 BPM

## 2022-07-14 DIAGNOSIS — R30.0 DYSURIA: ICD-10-CM

## 2022-07-14 DIAGNOSIS — R30.0 DYSURIA: Primary | ICD-10-CM

## 2022-07-14 LAB
APPEARANCE UR: CLEAR
BACTERIA #/AREA URNS AUTO: NORMAL /HPF
BILIRUB SERPL-MCNC: NORMAL MG/DL
BILIRUB UR QL STRIP.AUTO: NEGATIVE MG/DL
BLOOD URINE, POC: NEGATIVE
CLARITY, POC UA: CLEAR
COLOR UR AUTO: YELLOW
COLOR, POC UA: NORMAL
GLUCOSE UR QL STRIP.AUTO: NEGATIVE MG/DL
GLUCOSE UR QL STRIP: NEGATIVE
KETONES UR QL STRIP.AUTO: NEGATIVE MG/DL
KETONES UR QL STRIP: NEGATIVE
LEUKOCYTE ESTERASE UR QL STRIP.AUTO: NEGATIVE UNIT/L
LEUKOCYTE ESTERASE URINE, POC: NORMAL
NITRITE UR QL STRIP.AUTO: NEGATIVE
NITRITE, POC UA: NEGATIVE
PH UR STRIP.AUTO: 8.5 [PH]
PH, POC UA: 7.5
PROT UR QL STRIP.AUTO: NEGATIVE MG/DL
PROTEIN, POC: NEGATIVE
RBC #/AREA URNS AUTO: NORMAL /HPF
RBC UR QL AUTO: NEGATIVE UNIT/L
SP GR UR STRIP.AUTO: 1.02
SPECIFIC GRAVITY, POC UA: 1.1
SQUAMOUS #/AREA URNS AUTO: NORMAL /HPF
UROBILINOGEN UR STRIP-ACNC: 0.2 MG/DL
UROBILINOGEN, POC UA: 0.2
WBC #/AREA URNS AUTO: NORMAL /HPF

## 2022-07-14 PROCEDURE — 1159F MED LIST DOCD IN RCRD: CPT | Mod: CPTII,,, | Performed by: NURSE PRACTITIONER

## 2022-07-14 PROCEDURE — 3075F PR MOST RECENT SYSTOLIC BLOOD PRESS GE 130-139MM HG: ICD-10-PCS | Mod: CPTII,,, | Performed by: NURSE PRACTITIONER

## 2022-07-14 PROCEDURE — 81001 URINALYSIS AUTO W/SCOPE: CPT | Performed by: NURSE PRACTITIONER

## 2022-07-14 PROCEDURE — 99213 OFFICE O/P EST LOW 20 MIN: CPT | Mod: ,,, | Performed by: NURSE PRACTITIONER

## 2022-07-14 PROCEDURE — 1159F PR MEDICATION LIST DOCUMENTED IN MEDICAL RECORD: ICD-10-PCS | Mod: CPTII,,, | Performed by: NURSE PRACTITIONER

## 2022-07-14 PROCEDURE — 3075F SYST BP GE 130 - 139MM HG: CPT | Mod: CPTII,,, | Performed by: NURSE PRACTITIONER

## 2022-07-14 PROCEDURE — 3078F PR MOST RECENT DIASTOLIC BLOOD PRESSURE < 80 MM HG: ICD-10-PCS | Mod: CPTII,,, | Performed by: NURSE PRACTITIONER

## 2022-07-14 PROCEDURE — 99213 PR OFFICE/OUTPT VISIT, EST, LEVL III, 20-29 MIN: ICD-10-PCS | Mod: ,,, | Performed by: NURSE PRACTITIONER

## 2022-07-14 PROCEDURE — 3008F BODY MASS INDEX DOCD: CPT | Mod: CPTII,,, | Performed by: NURSE PRACTITIONER

## 2022-07-14 PROCEDURE — 3008F PR BODY MASS INDEX (BMI) DOCUMENTED: ICD-10-PCS | Mod: CPTII,,, | Performed by: NURSE PRACTITIONER

## 2022-07-14 PROCEDURE — 3078F DIAST BP <80 MM HG: CPT | Mod: CPTII,,, | Performed by: NURSE PRACTITIONER

## 2022-07-14 PROCEDURE — 81002 POCT URINE DIPSTICK WITHOUT MICROSCOPE: ICD-10-PCS | Mod: ,,, | Performed by: NURSE PRACTITIONER

## 2022-07-14 PROCEDURE — 81002 URINALYSIS NONAUTO W/O SCOPE: CPT | Mod: ,,, | Performed by: NURSE PRACTITIONER

## 2022-07-14 RX ORDER — CHOLECALCIFEROL (VITAMIN D3) 125 MCG
2000 TABLET ORAL DAILY
COMMUNITY
End: 2022-12-20 | Stop reason: SDUPTHER

## 2022-07-14 RX ORDER — NITROFURANTOIN 25; 75 MG/1; MG/1
100 CAPSULE ORAL 2 TIMES DAILY
Qty: 10 CAPSULE | Refills: 0 | Status: SHIPPED | OUTPATIENT
Start: 2022-07-14 | End: 2022-07-19

## 2022-07-14 NOTE — PROGRESS NOTES
Subjective:       Patient ID: Leanne Hahn is a 61 y.o. female.    Chief Complaint: Dysuria (With complaints of bladder spasms with urination and foul smelling urine x 4 days)    This is a 61-year-old female who presents to the clinic with complaints of dysuria and foul-smelling urine x 4 days  Patient denies any hematuria, pyuria, vaginal discharge, flank pain or fever.    Review of Systems   Constitutional: Negative.    HENT: Negative.    Eyes: Negative.    Respiratory: Negative.    Cardiovascular: Negative.    Gastrointestinal: Negative.    Endocrine: Negative.    Genitourinary: Positive for dysuria.   Musculoskeletal: Negative.    Integumentary:  Negative.   Allergic/Immunologic: Negative.    Neurological: Negative.    Hematological: Negative.    Psychiatric/Behavioral: Negative.          Objective:      Physical Exam  Constitutional:       Appearance: Normal appearance. She is obese.   HENT:      Head: Normocephalic.      Right Ear: Tympanic membrane, ear canal and external ear normal.      Left Ear: Tympanic membrane, ear canal and external ear normal.      Nose: Nose normal.      Mouth/Throat:      Mouth: Mucous membranes are moist.      Pharynx: Oropharynx is clear.   Eyes:      Extraocular Movements: Extraocular movements intact.      Conjunctiva/sclera: Conjunctivae normal.      Pupils: Pupils are equal, round, and reactive to light.   Cardiovascular:      Rate and Rhythm: Normal rate and regular rhythm.      Pulses: Normal pulses.      Heart sounds: Normal heart sounds.   Pulmonary:      Effort: Pulmonary effort is normal.      Breath sounds: Normal breath sounds.   Abdominal:      General: Bowel sounds are normal.      Palpations: Abdomen is soft.   Musculoskeletal:         General: Normal range of motion.      Cervical back: Normal range of motion and neck supple.   Skin:     General: Skin is warm and dry.   Neurological:      General: No focal deficit present.      Mental Status: She is alert and  oriented to person, place, and time. Mental status is at baseline.   Psychiatric:         Mood and Affect: Mood normal.         Behavior: Behavior normal.         Thought Content: Thought content normal.         Judgment: Judgment normal.         Assessment:       Problem List Items Addressed This Visit        Renal/    Dysuria - Primary     UA collected and sent to lab for analysis.  Macrobid 100 mg p.o. b.i.d. x5 days into pharmacy.  Perineal hygiene discussed.  Instructed patient to increase fluid intake to 6-8 bottles of water daily.           Relevant Orders    POCT URINE DIPSTICK WITHOUT MICROSCOPE (Completed)    Urinalysis, Reflex to Urine Culture Urine, Clean Catch          Plan:   Return to clinic if symptoms do not improve.

## 2022-07-18 RX ORDER — VENLAFAXINE HYDROCHLORIDE 75 MG/1
75 CAPSULE, EXTENDED RELEASE ORAL DAILY
Qty: 30 CAPSULE | Refills: 11 | Status: SHIPPED | OUTPATIENT
Start: 2022-07-18 | End: 2022-12-20 | Stop reason: SDUPTHER

## 2022-07-21 NOTE — ASSESSMENT & PLAN NOTE
UA collected and sent to lab for analysis.  Macrobid 100 mg p.o. b.i.d. x5 days into pharmacy.  Perineal hygiene discussed.  Instructed patient to increase fluid intake to 6-8 bottles of water daily.

## 2022-08-01 RX ORDER — LOVASTATIN 40 MG/1
40 TABLET ORAL NIGHTLY
Qty: 30 TABLET | Refills: 11 | Status: SHIPPED | OUTPATIENT
Start: 2022-08-01 | End: 2022-12-20 | Stop reason: SDUPTHER

## 2022-08-02 ENCOUNTER — DOCUMENTATION ONLY (OUTPATIENT)
Dept: ADMINISTRATIVE | Facility: HOSPITAL | Age: 62
End: 2022-08-02
Payer: MEDICAID

## 2022-09-12 PROBLEM — Z00.00 WELLNESS EXAMINATION: Status: RESOLVED | Noted: 2022-06-08 | Resolved: 2022-09-12

## 2022-11-21 ENCOUNTER — DOCUMENTATION ONLY (OUTPATIENT)
Dept: ADMINISTRATIVE | Facility: HOSPITAL | Age: 62
End: 2022-11-21
Payer: MEDICAID

## 2022-12-20 ENCOUNTER — OFFICE VISIT (OUTPATIENT)
Dept: FAMILY MEDICINE | Facility: CLINIC | Age: 62
End: 2022-12-20
Payer: MEDICAID

## 2022-12-20 VITALS
DIASTOLIC BLOOD PRESSURE: 75 MMHG | OXYGEN SATURATION: 97 % | HEART RATE: 82 BPM | TEMPERATURE: 99 F | WEIGHT: 180.19 LBS | SYSTOLIC BLOOD PRESSURE: 118 MMHG | HEIGHT: 62 IN | BODY MASS INDEX: 33.16 KG/M2 | RESPIRATION RATE: 18 BRPM

## 2022-12-20 DIAGNOSIS — F32.A DEPRESSION, UNSPECIFIED DEPRESSION TYPE: ICD-10-CM

## 2022-12-20 DIAGNOSIS — R30.0 DYSURIA: Primary | ICD-10-CM

## 2022-12-20 LAB
APPEARANCE UR: CLEAR
BACTERIA #/AREA URNS AUTO: ABNORMAL /HPF
BILIRUB SERPL-MCNC: NORMAL MG/DL
BILIRUB UR QL STRIP.AUTO: NEGATIVE MG/DL
BLOOD URINE, POC: NORMAL
CLARITY, POC UA: CLEAR
COLOR UR AUTO: YELLOW
COLOR, POC UA: YELLOW
GLUCOSE UR QL STRIP.AUTO: NEGATIVE MG/DL
GLUCOSE UR QL STRIP: NORMAL
KETONES UR QL STRIP.AUTO: NEGATIVE MG/DL
KETONES UR QL STRIP: NORMAL
LEUKOCYTE ESTERASE UR QL STRIP.AUTO: ABNORMAL UNIT/L
LEUKOCYTE ESTERASE URINE, POC: NORMAL
NITRITE UR QL STRIP.AUTO: NEGATIVE
NITRITE, POC UA: NORMAL
PH UR STRIP.AUTO: 6.5 [PH]
PH, POC UA: 6.5
PROT UR QL STRIP.AUTO: NEGATIVE MG/DL
PROTEIN, POC: NORMAL
RBC #/AREA URNS AUTO: ABNORMAL /HPF
RBC UR QL AUTO: ABNORMAL UNIT/L
SP GR UR STRIP.AUTO: 1.01
SPECIFIC GRAVITY, POC UA: 1.1
SQUAMOUS #/AREA URNS AUTO: ABNORMAL /HPF
UROBILINOGEN UR STRIP-ACNC: 0.2 MG/DL
UROBILINOGEN, POC UA: 0.2
WBC #/AREA URNS AUTO: ABNORMAL /HPF

## 2022-12-20 PROCEDURE — 3074F PR MOST RECENT SYSTOLIC BLOOD PRESSURE < 130 MM HG: ICD-10-PCS | Mod: CPTII,,, | Performed by: NURSE PRACTITIONER

## 2022-12-20 PROCEDURE — 81002 POCT URINE DIPSTICK WITHOUT MICROSCOPE: ICD-10-PCS | Mod: ,,, | Performed by: NURSE PRACTITIONER

## 2022-12-20 PROCEDURE — 87088 URINE BACTERIA CULTURE: CPT | Performed by: NURSE PRACTITIONER

## 2022-12-20 PROCEDURE — 1159F PR MEDICATION LIST DOCUMENTED IN MEDICAL RECORD: ICD-10-PCS | Mod: CPTII,,, | Performed by: NURSE PRACTITIONER

## 2022-12-20 PROCEDURE — 1159F MED LIST DOCD IN RCRD: CPT | Mod: CPTII,,, | Performed by: NURSE PRACTITIONER

## 2022-12-20 PROCEDURE — 81002 URINALYSIS NONAUTO W/O SCOPE: CPT | Mod: ,,, | Performed by: NURSE PRACTITIONER

## 2022-12-20 PROCEDURE — 81003 URINALYSIS AUTO W/O SCOPE: CPT | Performed by: NURSE PRACTITIONER

## 2022-12-20 PROCEDURE — 99214 OFFICE O/P EST MOD 30 MIN: CPT | Mod: ,,, | Performed by: NURSE PRACTITIONER

## 2022-12-20 PROCEDURE — 3008F PR BODY MASS INDEX (BMI) DOCUMENTED: ICD-10-PCS | Mod: CPTII,,, | Performed by: NURSE PRACTITIONER

## 2022-12-20 PROCEDURE — 81001 URINALYSIS AUTO W/SCOPE: CPT | Performed by: NURSE PRACTITIONER

## 2022-12-20 PROCEDURE — 3074F SYST BP LT 130 MM HG: CPT | Mod: CPTII,,, | Performed by: NURSE PRACTITIONER

## 2022-12-20 PROCEDURE — 99214 PR OFFICE/OUTPT VISIT, EST, LEVL IV, 30-39 MIN: ICD-10-PCS | Mod: ,,, | Performed by: NURSE PRACTITIONER

## 2022-12-20 PROCEDURE — 3078F DIAST BP <80 MM HG: CPT | Mod: CPTII,,, | Performed by: NURSE PRACTITIONER

## 2022-12-20 PROCEDURE — 3008F BODY MASS INDEX DOCD: CPT | Mod: CPTII,,, | Performed by: NURSE PRACTITIONER

## 2022-12-20 PROCEDURE — 3078F PR MOST RECENT DIASTOLIC BLOOD PRESSURE < 80 MM HG: ICD-10-PCS | Mod: CPTII,,, | Performed by: NURSE PRACTITIONER

## 2022-12-20 RX ORDER — LEVOCETIRIZINE DIHYDROCHLORIDE 5 MG/1
5 TABLET, FILM COATED ORAL NIGHTLY
Qty: 30 TABLET | Refills: 11 | Status: SHIPPED | OUTPATIENT
Start: 2022-12-20 | End: 2024-01-03 | Stop reason: SDUPTHER

## 2022-12-20 RX ORDER — FOLIC ACID 1 MG/1
1 TABLET ORAL DAILY
Qty: 30 TABLET | Refills: 11 | Status: SHIPPED | OUTPATIENT
Start: 2022-12-20 | End: 2023-06-12 | Stop reason: SDUPTHER

## 2022-12-20 RX ORDER — LEVOCETIRIZINE DIHYDROCHLORIDE 5 MG/1
5 TABLET, FILM COATED ORAL NIGHTLY
COMMUNITY
Start: 2022-09-09 | End: 2022-12-20 | Stop reason: SDUPTHER

## 2022-12-20 RX ORDER — LEVOTHYROXINE SODIUM 50 UG/1
50 TABLET ORAL DAILY
Qty: 30 TABLET | Refills: 6 | Status: SHIPPED | OUTPATIENT
Start: 2022-12-20 | End: 2023-06-16 | Stop reason: SDUPTHER

## 2022-12-20 RX ORDER — FERROUS SULFATE 324(65)MG
324 TABLET, DELAYED RELEASE (ENTERIC COATED) ORAL DAILY
Qty: 30 TABLET | Refills: 11 | Status: SHIPPED | OUTPATIENT
Start: 2022-12-20 | End: 2023-06-16 | Stop reason: SDUPTHER

## 2022-12-20 RX ORDER — VENLAFAXINE HYDROCHLORIDE 75 MG/1
75 CAPSULE, EXTENDED RELEASE ORAL DAILY
Qty: 30 CAPSULE | Refills: 11 | Status: SHIPPED | OUTPATIENT
Start: 2022-12-20 | End: 2023-10-03 | Stop reason: SDUPTHER

## 2022-12-20 RX ORDER — OMEPRAZOLE 20 MG/1
20 CAPSULE, DELAYED RELEASE ORAL DAILY
Qty: 30 CAPSULE | Refills: 11 | Status: SHIPPED | OUTPATIENT
Start: 2022-12-20 | End: 2024-01-03 | Stop reason: SDUPTHER

## 2022-12-20 RX ORDER — CHOLECALCIFEROL (VITAMIN D3) 125 MCG
2000 TABLET ORAL DAILY
Qty: 30 TABLET | Refills: 11 | Status: SHIPPED | OUTPATIENT
Start: 2022-12-20

## 2022-12-20 RX ORDER — MELOXICAM 7.5 MG/1
TABLET ORAL
Qty: 30 TABLET | Refills: 11 | Status: SHIPPED | OUTPATIENT
Start: 2022-12-20 | End: 2023-06-12 | Stop reason: SDUPTHER

## 2022-12-20 RX ORDER — LOVASTATIN 40 MG/1
40 TABLET ORAL NIGHTLY
Qty: 30 TABLET | Refills: 11 | Status: SHIPPED | OUTPATIENT
Start: 2022-12-20 | End: 2023-06-16 | Stop reason: SDUPTHER

## 2022-12-20 NOTE — PROGRESS NOTES
Subjective:       Patient ID: Leanne Hahn is a 62 y.o. female.    No past medical history on file.     Chief Complaint: Follow-up (6 MO) and Dysuria (C/o dysuria, burning urination and vaginal spasms for a couple of days.)    This is a 62-year-old female presents to the clinic for a six-month follow-up appointment.  Patient also presents with complaints of dysuria x1 week.  Patient denies any hematuria, pyuria, flank pain , vaginal discharge or fever.    Follow-up    Dysuria     Review of Systems   Constitutional: Negative.    HENT: Negative.     Eyes: Negative.    Respiratory: Negative.     Cardiovascular: Negative.    Gastrointestinal: Negative.    Endocrine: Negative.    Genitourinary:  Positive for dysuria.   Musculoskeletal: Negative.    Integumentary:  Negative.   Allergic/Immunologic: Negative.    Neurological: Negative.    Hematological: Negative.    Psychiatric/Behavioral: Negative.           Objective:      Physical Exam  Constitutional:       Appearance: Normal appearance. She is obese.   HENT:      Head: Normocephalic.      Right Ear: Tympanic membrane, ear canal and external ear normal.      Left Ear: Tympanic membrane, ear canal and external ear normal.      Nose: Nose normal.      Mouth/Throat:      Mouth: Mucous membranes are moist.      Pharynx: Oropharynx is clear.   Eyes:      Extraocular Movements: Extraocular movements intact.      Conjunctiva/sclera: Conjunctivae normal.      Pupils: Pupils are equal, round, and reactive to light.   Cardiovascular:      Rate and Rhythm: Normal rate and regular rhythm.      Pulses: Normal pulses.      Heart sounds: Normal heart sounds.   Pulmonary:      Effort: Pulmonary effort is normal.      Breath sounds: Normal breath sounds.   Abdominal:      General: Bowel sounds are normal.      Palpations: Abdomen is soft.   Musculoskeletal:         General: Normal range of motion.      Cervical back: Normal range of motion and neck supple.   Skin:     General: Skin is  warm and dry.   Neurological:      General: No focal deficit present.      Mental Status: She is alert and oriented to person, place, and time. Mental status is at baseline.   Psychiatric:         Mood and Affect: Mood normal.         Behavior: Behavior normal.         Thought Content: Thought content normal.         Judgment: Judgment normal.         Assessment & Plan:   1. Dysuria  -     Urinalysis, Reflex to Urine Culture Urine, Clean Catch  -     POCT URINE DIPSTICK WITHOUT MICROSCOPE  -     Cancel: Urinalysis, Reflex to Urine Culture Urine, Clean Catch  -     Urinalysis, Microscopic  -     Urine culture    2. Depression, unspecified depression type  Assessment & Plan:  Stable.  Continue current management.  Report to ED with any suicidal or homicidal ideations.      Other orders  -     venlafaxine (EFFEXOR-XR) 75 MG 24 hr capsule; Take 1 capsule (75 mg total) by mouth once daily.  Dispense: 30 capsule; Refill: 11  -     omeprazole (PRILOSEC) 20 MG capsule; Take 1 capsule (20 mg total) by mouth once daily.  Dispense: 30 capsule; Refill: 11  -     meloxicam (MOBIC) 7.5 MG tablet; See Instructions, TAKE 1 TABLET BY MOUTH ONCE DAILY AS NEEDED FOR PAIN, # 30 tab(s), 11 Refill(s), Pharmacy: [facility], 157.5, cm, Height/Length Dosing, 06/08/21 13:07:00 CDT, 78.2, kg, Weight Dosing, 06/08/21 13:07:00 CDT  Dispense: 30 tablet; Refill: 11  -     lovastatin (MEVACOR) 40 MG tablet; Take 1 tablet (40 mg total) by mouth nightly.  Dispense: 30 tablet; Refill: 11  -     levothyroxine (EUTHYROX) 50 MCG tablet; Take 1 tablet (50 mcg total) by mouth once daily.  Dispense: 30 tablet; Refill: 6  -     levocetirizine (XYZAL) 5 MG tablet; Take 1 tablet (5 mg total) by mouth every evening.  Dispense: 30 tablet; Refill: 11  -     folic acid (FOLVITE) 1 MG tablet; Take 1 tablet (1 mg total) by mouth once daily.  Dispense: 30 tablet; Refill: 11  -     ferrous sulfate 324 mg (65 mg iron) TbEC; Take 1 tablet (324 mg total) by  mouth once daily.  Dispense: 30 tablet; Refill: 11  -     ergocalciferol, vitamin D2, 50 mcg (2,000 unit) Tab; Take 2,000 Units by mouth once daily.  Dispense: 30 tablet; Refill: 11          Follow up in about 6 months (around 6/20/2023) for Wellness Exam. In addition to their scheduled follow up, the patient has also been instructed to follow up on as needed basis.

## 2022-12-22 RX ORDER — CIPROFLOXACIN 250 MG/1
250 TABLET, FILM COATED ORAL EVERY 12 HOURS
Qty: 6 TABLET | Refills: 0 | Status: SHIPPED | OUTPATIENT
Start: 2022-12-22 | End: 2022-12-25

## 2022-12-23 LAB — BACTERIA UR CULT: ABNORMAL

## 2023-01-09 ENCOUNTER — TELEPHONE (OUTPATIENT)
Dept: FAMILY MEDICINE | Facility: CLINIC | Age: 63
End: 2023-01-09
Payer: MEDICAID

## 2023-01-09 DIAGNOSIS — Z12.11 ENCOUNTER FOR SCREENING COLONOSCOPY: Primary | ICD-10-CM

## 2023-01-09 NOTE — TELEPHONE ENCOUNTER
Pt aware.    ----- Message from SALAZAR Coughlin sent at 1/9/2023 11:38 AM CST -----  Regarding: RE: Referral Request  Done    ----- Message -----  From: Evelin Truong  Sent: 1/9/2023  11:00 AM CST  To: [facility] Staff  Subject: Referral Request                                 .Type:  Patient Requesting Referral    Who Called:pt  Does the patient already have the specialty appointment scheduled?:unknown  If yes, what is the date of that appointment?:unknown  Referral to What Specialty:Colonscopy  Reason for Referral:screening colon   Does the patient want the referral with a specific physician?:[facility]   Is the specialist an [facility] or [facility] Physician?:yes  Patient Requesting a Response?:yes  Would the patient rather a call back or a response via [facility]? Call back   Best Call Back Number:[phone]  Additional Information: pt would like an referral for an coloscopy, last done in 2018

## 2023-01-18 ENCOUNTER — TELEPHONE (OUTPATIENT)
Dept: FAMILY MEDICINE | Facility: CLINIC | Age: 63
End: 2023-01-18
Payer: MEDICAID

## 2023-01-18 NOTE — TELEPHONE ENCOUNTER
----- Message from SALAZAR Coughlin sent at 1/17/2023  4:46 PM CST -----  Regarding: RE: refill  Rx sent    ----- Message -----  From: Sarah Aguillon  Sent: 1/17/2023   4:39 PM CST  To: [facility] Staff  Subject: refill                                           Pt called about needing a medication fill. She stated that she called this morning and Friday and said the she spoke with someone and they left a message about it both times she called.  [phone]

## 2023-02-20 LAB — BCS RECOMMENDATION EXT: NORMAL

## 2023-05-29 ENCOUNTER — TELEPHONE (OUTPATIENT)
Dept: FAMILY MEDICINE | Facility: CLINIC | Age: 63
End: 2023-05-29
Payer: MEDICAID

## 2023-05-29 DIAGNOSIS — Z00.00 WELLNESS EXAMINATION: Primary | ICD-10-CM

## 2023-06-06 ENCOUNTER — LAB VISIT (OUTPATIENT)
Dept: LAB | Facility: HOSPITAL | Age: 63
End: 2023-06-06
Attending: NURSE PRACTITIONER
Payer: MEDICAID

## 2023-06-06 DIAGNOSIS — Z00.00 WELLNESS EXAMINATION: ICD-10-CM

## 2023-06-06 LAB
ALBUMIN SERPL-MCNC: 3.5 G/DL (ref 3.4–4.8)
ALBUMIN/GLOB SERPL: 1.2 RATIO (ref 1.1–2)
ALP SERPL-CCNC: 64 UNIT/L (ref 40–150)
ALT SERPL-CCNC: 19 UNIT/L (ref 0–55)
AST SERPL-CCNC: 17 UNIT/L (ref 5–34)
BASOPHILS # BLD AUTO: 0.04 X10(3)/MCL
BASOPHILS NFR BLD AUTO: 0.5 %
BILIRUBIN DIRECT+TOT PNL SERPL-MCNC: 0.3 MG/DL
BUN SERPL-MCNC: 22.5 MG/DL (ref 9.8–20.1)
CALCIUM SERPL-MCNC: 9.2 MG/DL (ref 8.4–10.2)
CHLORIDE SERPL-SCNC: 105 MMOL/L (ref 98–107)
CHOLEST SERPL-MCNC: 182 MG/DL
CHOLEST/HDLC SERPL: 3 {RATIO} (ref 0–5)
CO2 SERPL-SCNC: 28 MMOL/L (ref 23–31)
CREAT SERPL-MCNC: 0.75 MG/DL (ref 0.55–1.02)
DEPRECATED CALCIDIOL+CALCIFEROL SERPL-MC: 54.8 NG/ML (ref 30–80)
EOSINOPHIL # BLD AUTO: 0.3 X10(3)/MCL (ref 0–0.9)
EOSINOPHIL NFR BLD AUTO: 3.7 %
ERYTHROCYTE [DISTWIDTH] IN BLOOD BY AUTOMATED COUNT: 12.9 % (ref 11.5–17)
EST. AVERAGE GLUCOSE BLD GHB EST-MCNC: 96.8 MG/DL
GFR SERPLBLD CREATININE-BSD FMLA CKD-EPI: >60 MLS/MIN/1.73/M2
GLOBULIN SER-MCNC: 2.9 GM/DL (ref 2.4–3.5)
GLUCOSE SERPL-MCNC: 90 MG/DL (ref 82–115)
HBA1C MFR BLD: 5 %
HCT VFR BLD AUTO: 36.3 % (ref 37–47)
HDLC SERPL-MCNC: 54 MG/DL (ref 35–60)
HGB BLD-MCNC: 11.3 G/DL (ref 12–16)
IMM GRANULOCYTES # BLD AUTO: 0.01 X10(3)/MCL (ref 0–0.04)
IMM GRANULOCYTES NFR BLD AUTO: 0.1 %
LDLC SERPL CALC-MCNC: 95 MG/DL (ref 50–140)
LYMPHOCYTES # BLD AUTO: 2.6 X10(3)/MCL (ref 0.6–4.6)
LYMPHOCYTES NFR BLD AUTO: 32.4 %
MCH RBC QN AUTO: 31 PG (ref 27–31)
MCHC RBC AUTO-ENTMCNC: 31.1 G/DL (ref 33–36)
MCV RBC AUTO: 99.7 FL (ref 80–94)
MONOCYTES # BLD AUTO: 0.82 X10(3)/MCL (ref 0.1–1.3)
MONOCYTES NFR BLD AUTO: 10.2 %
NEUTROPHILS # BLD AUTO: 4.25 X10(3)/MCL (ref 2.1–9.2)
NEUTROPHILS NFR BLD AUTO: 53.1 %
PLATELET # BLD AUTO: 347 X10(3)/MCL (ref 130–400)
PMV BLD AUTO: 10.5 FL (ref 7.4–10.4)
POTASSIUM SERPL-SCNC: 4.1 MMOL/L (ref 3.5–5.1)
PROT SERPL-MCNC: 6.4 GM/DL (ref 5.8–7.6)
RBC # BLD AUTO: 3.64 X10(6)/MCL (ref 4.2–5.4)
SODIUM SERPL-SCNC: 139 MMOL/L (ref 136–145)
TRIGL SERPL-MCNC: 165 MG/DL (ref 37–140)
TSH SERPL-ACNC: 2.95 UIU/ML (ref 0.35–4.94)
VLDLC SERPL CALC-MCNC: 33 MG/DL
WBC # SPEC AUTO: 8.02 X10(3)/MCL (ref 4.5–11.5)

## 2023-06-06 PROCEDURE — 85025 COMPLETE CBC W/AUTO DIFF WBC: CPT

## 2023-06-06 PROCEDURE — 80053 COMPREHEN METABOLIC PANEL: CPT

## 2023-06-06 PROCEDURE — 84443 ASSAY THYROID STIM HORMONE: CPT

## 2023-06-06 PROCEDURE — 82306 VITAMIN D 25 HYDROXY: CPT

## 2023-06-06 PROCEDURE — 36415 COLL VENOUS BLD VENIPUNCTURE: CPT

## 2023-06-06 PROCEDURE — 83036 HEMOGLOBIN GLYCOSYLATED A1C: CPT

## 2023-06-06 PROCEDURE — 80061 LIPID PANEL: CPT

## 2023-06-12 ENCOUNTER — TELEPHONE (OUTPATIENT)
Dept: FAMILY MEDICINE | Facility: CLINIC | Age: 63
End: 2023-06-12
Payer: MEDICAID

## 2023-06-12 DIAGNOSIS — Z76.0 MEDICATION REFILL: ICD-10-CM

## 2023-06-12 DIAGNOSIS — D50.8 OTHER IRON DEFICIENCY ANEMIA: Primary | ICD-10-CM

## 2023-06-12 DIAGNOSIS — G89.29 CHRONIC PAIN OF LEFT KNEE: ICD-10-CM

## 2023-06-12 DIAGNOSIS — M25.562 CHRONIC PAIN OF LEFT KNEE: ICD-10-CM

## 2023-06-12 RX ORDER — MELOXICAM 7.5 MG/1
TABLET ORAL
Qty: 30 TABLET | Refills: 11 | Status: SHIPPED | OUTPATIENT
Start: 2023-06-12 | End: 2024-01-04 | Stop reason: SDUPTHER

## 2023-06-12 RX ORDER — FOLIC ACID 1 MG/1
1 TABLET ORAL DAILY
Qty: 30 TABLET | Refills: 11 | Status: SHIPPED | OUTPATIENT
Start: 2023-06-12 | End: 2024-01-04 | Stop reason: SDUPTHER

## 2023-06-16 ENCOUNTER — OFFICE VISIT (OUTPATIENT)
Dept: FAMILY MEDICINE | Facility: CLINIC | Age: 63
End: 2023-06-16
Payer: MEDICAID

## 2023-06-16 VITALS
HEART RATE: 81 BPM | OXYGEN SATURATION: 99 % | SYSTOLIC BLOOD PRESSURE: 126 MMHG | WEIGHT: 181.88 LBS | BODY MASS INDEX: 33.47 KG/M2 | TEMPERATURE: 98 F | HEIGHT: 62 IN | DIASTOLIC BLOOD PRESSURE: 81 MMHG | RESPIRATION RATE: 18 BRPM

## 2023-06-16 DIAGNOSIS — E03.9 HYPOTHYROIDISM, UNSPECIFIED TYPE: ICD-10-CM

## 2023-06-16 DIAGNOSIS — E66.9 OBESITY, UNSPECIFIED CLASSIFICATION, UNSPECIFIED OBESITY TYPE, UNSPECIFIED WHETHER SERIOUS COMORBIDITY PRESENT: ICD-10-CM

## 2023-06-16 DIAGNOSIS — Z00.00 WELL ADULT EXAM: Primary | ICD-10-CM

## 2023-06-16 DIAGNOSIS — F32.A DEPRESSION, UNSPECIFIED DEPRESSION TYPE: ICD-10-CM

## 2023-06-16 PROCEDURE — 99396 PREV VISIT EST AGE 40-64: CPT | Mod: ,,, | Performed by: NURSE PRACTITIONER

## 2023-06-16 PROCEDURE — 3079F DIAST BP 80-89 MM HG: CPT | Mod: CPTII,,, | Performed by: NURSE PRACTITIONER

## 2023-06-16 PROCEDURE — 3008F PR BODY MASS INDEX (BMI) DOCUMENTED: ICD-10-PCS | Mod: CPTII,,, | Performed by: NURSE PRACTITIONER

## 2023-06-16 PROCEDURE — 3074F SYST BP LT 130 MM HG: CPT | Mod: CPTII,,, | Performed by: NURSE PRACTITIONER

## 2023-06-16 PROCEDURE — 1160F RVW MEDS BY RX/DR IN RCRD: CPT | Mod: CPTII,,, | Performed by: NURSE PRACTITIONER

## 2023-06-16 PROCEDURE — 3008F BODY MASS INDEX DOCD: CPT | Mod: CPTII,,, | Performed by: NURSE PRACTITIONER

## 2023-06-16 PROCEDURE — 1159F MED LIST DOCD IN RCRD: CPT | Mod: CPTII,,, | Performed by: NURSE PRACTITIONER

## 2023-06-16 PROCEDURE — 3079F PR MOST RECENT DIASTOLIC BLOOD PRESSURE 80-89 MM HG: ICD-10-PCS | Mod: CPTII,,, | Performed by: NURSE PRACTITIONER

## 2023-06-16 PROCEDURE — 1160F PR REVIEW ALL MEDS BY PRESCRIBER/CLIN PHARMACIST DOCUMENTED: ICD-10-PCS | Mod: CPTII,,, | Performed by: NURSE PRACTITIONER

## 2023-06-16 PROCEDURE — 99396 PR PREVENTIVE VISIT,EST,40-64: ICD-10-PCS | Mod: ,,, | Performed by: NURSE PRACTITIONER

## 2023-06-16 PROCEDURE — 3074F PR MOST RECENT SYSTOLIC BLOOD PRESSURE < 130 MM HG: ICD-10-PCS | Mod: CPTII,,, | Performed by: NURSE PRACTITIONER

## 2023-06-16 PROCEDURE — 1159F PR MEDICATION LIST DOCUMENTED IN MEDICAL RECORD: ICD-10-PCS | Mod: CPTII,,, | Performed by: NURSE PRACTITIONER

## 2023-06-16 RX ORDER — FERROUS SULFATE 324(65)MG
324 TABLET, DELAYED RELEASE (ENTERIC COATED) ORAL DAILY
Qty: 30 TABLET | Refills: 11 | Status: SHIPPED | OUTPATIENT
Start: 2023-06-16 | End: 2024-01-31

## 2023-06-16 RX ORDER — LEVOTHYROXINE SODIUM 50 UG/1
50 TABLET ORAL DAILY
Qty: 30 TABLET | Refills: 11 | Status: SHIPPED | OUTPATIENT
Start: 2023-06-16 | End: 2024-01-04 | Stop reason: SDUPTHER

## 2023-06-16 RX ORDER — LOVASTATIN 40 MG/1
40 TABLET ORAL NIGHTLY
Qty: 30 TABLET | Refills: 11 | Status: SHIPPED | OUTPATIENT
Start: 2023-06-16 | End: 2024-01-04 | Stop reason: SDUPTHER

## 2023-06-19 NOTE — PROGRESS NOTES
Patient ID: [id number]     Chief Complaint: Annual Exam      HPI:     Leanne Hahn is a 62 y.o. female here today for an annual wellness visit. No other complaints today.       Dental Exam - Recommend biannually  Vaccinations -   Immunization History   Administered Date(s) Administered    COVID-19, MRNA, LN-S, PF (MODERNA FULL 0.5 ML DOSE) 03/30/2021, 04/27/2021, 11/17/2021, 04/12/2022    Influenza 11/04/2009, 01/07/2014    Influenza - Quadrivalent - PF *Preferred* (6 months and older) 09/16/2020, 09/27/2021, 10/04/2022    Influenza - Trivalent - PF (ADULT) 10/23/2018, 12/05/2019    Td - PF (ADULT) 11/04/2009    Zoster Recombinant 12/12/2018, 03/12/2019        Past Medical History:  has no past medical history on file.    Surgical History:  has a past surgical history that includes Ovarian cyst removal; Cholecystectomy; and Colonoscopy.    Family History: family history is not on file.    Social History:  reports that she has never smoked. She has never used smokeless tobacco. She reports that she does not drink alcohol and does not use drugs.    Current Outpatient Medications   Medication Instructions    ergocalciferol (vitamin D2) 2,000 Units, Oral, Daily    estrogen, conjugated,-medroxyprogesterone 0.625-2.5mg (PREMPRO) 0.625-2.5 mg per tablet 1 tablet, Oral, Daily    ferrous sulfate 324 mg, Oral, Daily    folic acid (FOLVITE) 1 mg, Oral, Daily    levocetirizine (XYZAL) 5 mg, Oral, Nightly    levothyroxine (EUTHYROX) 50 mcg, Oral, Daily    lovastatin (MEVACOR) 40 mg, Oral, Nightly    meloxicam (MOBIC) 7.5 MG tablet <BR>See Instructions, TAKE 1 TABLET BY MOUTH ONCE DAILY AS NEEDED FOR PAIN, # 30 tab(s), 11 Refill(s), Pharmacy: [facility], 157.5, cm, Height/Length Dosing, 06/08/21 13:07:00 CDT, 78.2, kg, Weight Dosing, 06/08/21 13:07:00 CDT    omeprazole (PRILOSEC) 20 mg, Oral, Daily    venlafaxine (EFFEXOR-XR) 75 mg, Oral, Daily       Patient has No Known Allergies.     Patient Care Team:  Flor LINK  "SALAZAR Henderson as PCP - General (Nurse Practitioner)       Subjective:     Review of Systems    12 point review of systems conducted, negative except as stated in the history of present illness. See HPI for details.      Objective:     Visit Vitals  /81 (BP Location: Left arm, Patient Position: Sitting)   Pulse 81   Temp 97.8 °F (36.6 °C) (Oral)   Resp 18   Ht 5' 2.21" (1.58 m)   Wt 82.5 kg (181 lb 14.4 oz)   LMP  (LMP Unknown)   SpO2 99%   BMI 33.05 kg/m²       Physical Exam    General: Alert and oriented, No acute distress.  Head: Normocephalic, Atraumatic.  Eye: Pupils are equal, round and reactive to light, Extraocular movements are intact, Sclera non-icteric.  Ears/Nose/Throat: Normal, Mucosa moist,Clear.  Neck/Thyroid: Supple, Non-tender, No carotid bruit, No lymphadenopathy, No JVD, Full range of motion.  Respiratory: Clear to auscultation bilaterally; No wheezes, rales or rhonchi,Non-labored respirations, Symmetrical chest wall expansion.  Cardiovascular: Regular rate and rhythm, S1/S2 normal, No murmurs, rubs or gallops.  Gastrointestinal: Soft, Non-tender, Non-distended, Normal bowel sounds, No palpable organomegaly.  Musculoskeletal: Normal range of motion.  Integumentary: Warm, Dry, Intact, No suspicious lesions or rashes.  Extremities: No clubbing, cyanosis or edema  Neurologic: No focal deficits, Cranial Nerves II-XII are grossly intact, Motor strength normal upper and lower extremities, Sensory exam intact.  Psychiatric: Normal interaction, Coherent speech, Euthymic mood, Appropriate affect     Labs Reviewed:     Chemistry:  Lab Results   Component Value Date     06/06/2023    K 4.1 06/06/2023    CHLORIDE 105 06/06/2023    BUN 22.5 (H) 06/06/2023    CREATININE 0.75 06/06/2023    EGFRNORACEVR >60 06/06/2023    GLUCOSE 90 06/06/2023    CALCIUM 9.2 06/06/2023    ALKPHOS 64 06/06/2023    LABPROT 6.4 06/06/2023    ALBUMIN 3.5 06/06/2023    BILIDIR 0.1 06/04/2021    IBILI 0.20 06/04/2021    AST " 17 06/06/2023    ALT 19 06/06/2023    LBGTFKCM57HW 54.8 06/06/2023    TSH 2.954 06/06/2023    BPDABY4WRHP 0.89 06/04/2018        Lab Results   Component Value Date    HGBA1C 5.0 06/06/2023        Hematology:  Lab Results   Component Value Date    WBC 8.02 06/06/2023    HGB 11.3 (L) 06/06/2023    HCT 36.3 (L) 06/06/2023     06/06/2023       Lipid Panel:  Lab Results   Component Value Date    CHOL 182 06/06/2023    HDL 54 06/06/2023    LDL 95.00 06/06/2023    TRIG 165 (H) 06/06/2023    TOTALCHOLEST 3 06/06/2023        Urine:  Lab Results   Component Value Date    COLORUA Yellow 12/20/2022    APPEARANCEUA Clear 12/20/2022    SGUA 1.010 12/20/2022    PHUA 6.5 12/20/2022    PROTEINUA Negative 12/20/2022    GLUCOSEUA Negative 12/20/2022    KETONESUA Negative 12/20/2022    BLOODUA Trace-Intact (A) 12/20/2022    NITRITESUA Negative 12/20/2022    LEUKOCYTESUR Moderate (A) 12/20/2022    RBCUA 0-5 12/20/2022    WBCUA 11-20 (A) 12/20/2022    BACTERIA 1+ (A) 12/20/2022          Assessment:       ICD-10-CM ICD-9-CM   1. Well adult exam  Z00.00 V70.0   2. Hypothyroidism, unspecified type  E03.9 244.9   3. Depression, unspecified depression type  F32.A 311   4. Obesity, unspecified classification, unspecified obesity type, unspecified whether serious comorbidity present  E66.9 278.00        Plan:     1. Well adult exam  Healthy lifestyle discussed.    2. Hypothyroidism, unspecified type  Lab Results   Component Value Date    TSH 2.954 06/06/2023    HSNLZQ1FHRE 0.89 06/04/2018      Continue Levothyroxine 50 mcg p.o. daily  Take medicine on an empty stomach with water (no other medications or beverages). Wait 30 minutes to eat or drink.  Report any symptoms of thinning hair, breaking nails, fatigue, weight gain or loss, palpitations.       3. Depression, unspecified depression type  Controlled.  Continue current medication management   Exercise daily. Get sunlight daily.  Practice positive phrases and repeat throughout the  day, along with yoga and relaxation techniques.  Establish good social support, make changes to reduce stress.  Reports any symptoms of suicidal/homicidal ideations or self harm immediately, if clinic is closed go to nearest emergency room.      4. Obesity, unspecified classification, unspecified obesity type, unspecified whether serious comorbidity present  Body mass index is 33.05 kg/m².  Goal BMI <30.  Exercise 5 times a week for 30 minutes per day.  Avoid soda, simple sugars, excessive rice, potatoes or bread. Limit fast foods and fried foods.  Choose complex carbs in moderation (example: green vegetables, beans, oatmeal). Eat plenty of fresh fruits and vegetables with lean meats daily.  Do not skip meals. Eat a balanced portion size.  Avoid fad diets. Consider permanent healthy life style changes.         Follow up in about 6 months (around 12/16/2023) for Anxiety/Depression, Hypothyroid. In addition to their scheduled follow up, the patient has also been instructed to follow up on as needed basis.     Future Appointments   Date Time Provider Department Center   6/21/2023 10:00 AM Dixon Chino MD [facility]   12/22/2023  9:30 AM SALAZAR Coughlin [facility]    6/19/2024  9:30 AM SALAZAR Coughlin [facility]         SALAZAR Coughlin

## 2023-08-09 ENCOUNTER — PATIENT OUTREACH (OUTPATIENT)
Dept: ADMINISTRATIVE | Facility: HOSPITAL | Age: 63
End: 2023-08-09
Payer: MEDICARE

## 2023-08-09 PROBLEM — Z86.010 PERSONAL HISTORY OF COLONIC POLYPS: Status: ACTIVE | Noted: 2023-08-09

## 2023-08-09 PROBLEM — Z86.0100 PERSONAL HISTORY OF COLONIC POLYPS: Status: ACTIVE | Noted: 2023-08-09

## 2023-08-09 NOTE — LETTER
This communication is flagged as high priority.        AUTHORIZATION FOR RELEASE OF   CONFIDENTIAL INFORMATION    Dear [facility],    We are seeing Leanne Hahn, date of birth 1960, in the clinic at [facility]. Flor Henderson FNP is the patient's PCP. Leanne Hahn has an outstanding lab/procedure at the time we reviewed her chart. In order to help keep her health information updated, she has authorized us to request the following medical record(s):        ( X )  MAMMOGRAM       Dr Chino' office faxed orders on 23. Can you tell me if patient is scheduled or send me results.                                                 Please fax records to [facility], [place] [place] D, FNP, [phone]       If you have any questions, please contact Selena at [phone]           Patient Name: Leanne Hahn  : 1960  Patient Phone #: [phone]

## 2023-08-09 NOTE — PROGRESS NOTES
The following record(s)  below were uploaded for Health Maintenance .    MAMMOGRAM SCREENING      02/20/2023        Population Health Outreach.

## 2023-08-09 NOTE — PROGRESS NOTES
Population Health Outreach.    Dr Chino' office faxed Mammogram orders to [facility]  Imaging- record request sent to see if pt was scheduled.

## 2023-10-03 ENCOUNTER — OFFICE VISIT (OUTPATIENT)
Dept: FAMILY MEDICINE | Facility: CLINIC | Age: 63
End: 2023-10-03
Payer: MEDICAID

## 2023-10-03 VITALS
BODY MASS INDEX: 32.36 KG/M2 | HEART RATE: 79 BPM | TEMPERATURE: 98 F | SYSTOLIC BLOOD PRESSURE: 129 MMHG | HEIGHT: 63 IN | DIASTOLIC BLOOD PRESSURE: 79 MMHG | OXYGEN SATURATION: 98 % | WEIGHT: 182.63 LBS | RESPIRATION RATE: 18 BRPM

## 2023-10-03 DIAGNOSIS — N39.0 URINARY TRACT INFECTION WITHOUT HEMATURIA, SITE UNSPECIFIED: ICD-10-CM

## 2023-10-03 DIAGNOSIS — R30.0 DYSURIA: Primary | ICD-10-CM

## 2023-10-03 LAB
APPEARANCE UR: ABNORMAL
BACTERIA #/AREA URNS AUTO: ABNORMAL /HPF
BILIRUB SERPL-MCNC: NEGATIVE MG/DL
BILIRUB UR QL STRIP.AUTO: NEGATIVE
BLOOD URINE, POC: NORMAL
CLARITY, POC UA: NORMAL
COLOR UR AUTO: YELLOW
COLOR, POC UA: YELLOW
GLUCOSE UR QL STRIP.AUTO: NEGATIVE
GLUCOSE UR QL STRIP: NEGATIVE
KETONES UR QL STRIP.AUTO: NEGATIVE
KETONES UR QL STRIP: NORMAL
LEUKOCYTE ESTERASE UR QL STRIP.AUTO: ABNORMAL
LEUKOCYTE ESTERASE URINE, POC: NORMAL
NITRITE UR QL STRIP.AUTO: POSITIVE
NITRITE, POC UA: POSITIVE
PH UR STRIP.AUTO: 5 [PH]
PH, POC UA: 5
PROT UR QL STRIP.AUTO: NEGATIVE
PROTEIN, POC: NORMAL
RBC #/AREA URNS AUTO: ABNORMAL /HPF
RBC UR QL AUTO: ABNORMAL
SP GR UR STRIP.AUTO: 1.01 (ref 1–1.03)
SPECIFIC GRAVITY, POC UA: 1.1
SQUAMOUS #/AREA URNS AUTO: ABNORMAL /HPF
UROBILINOGEN UR STRIP-ACNC: 0.2
UROBILINOGEN, POC UA: 0.2
WBC #/AREA URNS AUTO: ABNORMAL /HPF

## 2023-10-03 PROCEDURE — 3008F PR BODY MASS INDEX (BMI) DOCUMENTED: ICD-10-PCS | Mod: CPTII,,, | Performed by: NURSE PRACTITIONER

## 2023-10-03 PROCEDURE — 87077 CULTURE AEROBIC IDENTIFY: CPT | Performed by: NURSE PRACTITIONER

## 2023-10-03 PROCEDURE — 81001 URINALYSIS AUTO W/SCOPE: CPT | Performed by: NURSE PRACTITIONER

## 2023-10-03 PROCEDURE — 3074F SYST BP LT 130 MM HG: CPT | Mod: CPTII,,, | Performed by: NURSE PRACTITIONER

## 2023-10-03 PROCEDURE — 3078F PR MOST RECENT DIASTOLIC BLOOD PRESSURE < 80 MM HG: ICD-10-PCS | Mod: CPTII,,, | Performed by: NURSE PRACTITIONER

## 2023-10-03 PROCEDURE — 3044F HG A1C LEVEL LT 7.0%: CPT | Mod: CPTII,,, | Performed by: NURSE PRACTITIONER

## 2023-10-03 PROCEDURE — 1159F MED LIST DOCD IN RCRD: CPT | Mod: CPTII,,, | Performed by: NURSE PRACTITIONER

## 2023-10-03 PROCEDURE — 3044F PR MOST RECENT HEMOGLOBIN A1C LEVEL <7.0%: ICD-10-PCS | Mod: CPTII,,, | Performed by: NURSE PRACTITIONER

## 2023-10-03 PROCEDURE — 3008F BODY MASS INDEX DOCD: CPT | Mod: CPTII,,, | Performed by: NURSE PRACTITIONER

## 2023-10-03 PROCEDURE — 81002 POCT URINE DIPSTICK WITHOUT MICROSCOPE: ICD-10-PCS | Mod: ,,, | Performed by: NURSE PRACTITIONER

## 2023-10-03 PROCEDURE — 81002 URINALYSIS NONAUTO W/O SCOPE: CPT | Mod: ,,, | Performed by: NURSE PRACTITIONER

## 2023-10-03 PROCEDURE — 99213 OFFICE O/P EST LOW 20 MIN: CPT | Mod: ,,, | Performed by: NURSE PRACTITIONER

## 2023-10-03 PROCEDURE — 99213 PR OFFICE/OUTPT VISIT, EST, LEVL III, 20-29 MIN: ICD-10-PCS | Mod: ,,, | Performed by: NURSE PRACTITIONER

## 2023-10-03 PROCEDURE — 1159F PR MEDICATION LIST DOCUMENTED IN MEDICAL RECORD: ICD-10-PCS | Mod: CPTII,,, | Performed by: NURSE PRACTITIONER

## 2023-10-03 PROCEDURE — 87088 URINE BACTERIA CULTURE: CPT | Performed by: NURSE PRACTITIONER

## 2023-10-03 PROCEDURE — 3074F PR MOST RECENT SYSTOLIC BLOOD PRESSURE < 130 MM HG: ICD-10-PCS | Mod: CPTII,,, | Performed by: NURSE PRACTITIONER

## 2023-10-03 PROCEDURE — 3078F DIAST BP <80 MM HG: CPT | Mod: CPTII,,, | Performed by: NURSE PRACTITIONER

## 2023-10-03 RX ORDER — CIPROFLOXACIN 500 MG/1
500 TABLET ORAL EVERY 12 HOURS
Qty: 6 TABLET | Refills: 0 | Status: SHIPPED | OUTPATIENT
Start: 2023-10-03 | End: 2023-10-06

## 2023-10-03 RX ORDER — VENLAFAXINE HYDROCHLORIDE 75 MG/1
75 CAPSULE, EXTENDED RELEASE ORAL DAILY
Qty: 30 CAPSULE | Refills: 11 | Status: SHIPPED | OUTPATIENT
Start: 2023-10-03 | End: 2024-01-04 | Stop reason: SDUPTHER

## 2023-10-03 NOTE — PATIENT INSTRUCTIONS
Corey Perdomo,     If you are due for any health screening(s) below please notify me so we can arrange them to be ordered and scheduled. Most healthy patients at your age complete them, but you are free to accept or refuse.     If you can't do it, I'll definitely understand. If you can, I'd certainly appreciate it!    All of your core healthy metrics are met.

## 2023-10-03 NOTE — PROGRESS NOTES
Patient ID: [id number]     Chief Complaint: Dysuria      HPI:     Leanne Hahn is a 62 y.o. female here today with complaints of dysuria and bladder spasms x1 week.  Patient denies hematuria, pyuria, and flank pain.    Past Medical History:  has a past medical history of Anemia, unspecified, Arthritis, Chronic pain of left knee, Depression, unspecified depression type, GERD (gastroesophageal reflux disease), Hypercholesterolemia, Hypothyroidism, unspecified type, Personal history of colonic polyps, and Sciatica.    Surgical History:  has a past surgical history that includes Ovarian cyst removal; Cholecystectomy; and Colonoscopy (02/09/2018).    Family History: family history is not on file.    Social History:  reports that she has never smoked. She has never used smokeless tobacco. She reports that she does not drink alcohol and does not use drugs.    Current Outpatient Medications   Medication Instructions    ciprofloxacin HCl (CIPRO) 500 mg, Oral, Every 12 hours    ergocalciferol (vitamin D2) 2,000 Units, Oral, Daily    estrogen, conjugated,-medroxyprogesterone 0.625-2.5mg (PREMPRO) 0.625-2.5 mg per tablet 1 tablet, Oral, Daily    ferrous sulfate 324 mg, Oral, Daily    folic acid (FOLVITE) 1 mg, Oral, Daily    levocetirizine (XYZAL) 5 mg, Oral, Nightly    levothyroxine (EUTHYROX) 50 mcg, Oral, Daily    lovastatin (MEVACOR) 40 mg, Oral, Nightly    meloxicam (MOBIC) 7.5 MG tablet <BR>See Instructions, TAKE 1 TABLET BY MOUTH ONCE DAILY AS NEEDED FOR PAIN, # 30 tab(s), 11 Refill(s), Pharmacy: [id number], 157.5, cm, Height/Length Dosing, 06/08/21 13:07:00 CDT, 78.2, kg, Weight Dosing, 06/08/21 13:07:00 CDT    omeprazole (PRILOSEC) 20 mg, Oral, Daily    venlafaxine (EFFEXOR-XR) 75 mg, Oral, Daily       Patient has No Known Allergies.     Patient Care Team:  Flor Henderson FNP as PCP - General (Nurse Practitioner)  Dixon Chino MD as Consulting Physician (Obstetrics and Gynecology)  Yovani  "OTONIEL Walters as Care Coordinator  Imaging, [facility] (Radiology)  Dixon Jonas MD (Surgery)       Subjective:     Review of Systems    12 point review of systems conducted, negative except as stated in the history of present illness. See HPI for details.      Objective:     Visit Vitals  /79 (BP Location: Left arm, Patient Position: Sitting)   Pulse 79   Temp 98.1 °F (36.7 °C) (Oral)   Resp 18   Ht 5' 2.99" (1.6 m)   Wt 82.8 kg (182 lb 9.6 oz)   LMP  (LMP Unknown)   SpO2 98%   BMI 32.35 kg/m²       Physical Exam    General: Alert and oriented, No acute distress.  Head: Normocephalic, Atraumatic.  Eye: Pupils are equal, round and reactive to light, Extraocular movements are intact, Sclera non-icteric.  Ears/Nose/Throat: Normal, Mucosa moist,Clear.  Neck/Thyroid: Supple, Non-tender, No carotid bruit, No lymphadenopathy, No JVD, Full range of motion.  Respiratory: Clear to auscultation bilaterally; No wheezes, rales or rhonchi,Non-labored respirations, Symmetrical chest wall expansion.  Cardiovascular: Regular rate and rhythm, S1/S2 normal, No murmurs, rubs or gallops.  Gastrointestinal: Soft, Non-tender, Non-distended, Normal bowel sounds, No palpable organomegaly.  Genitourinary:  No CVA tenderness  Musculoskeletal: Normal range of motion.  Integumentary: Warm, Dry, Intact, No suspicious lesions or rashes.  Extremities: No clubbing, cyanosis or edema  Neurologic: No focal deficits, Cranial Nerves II-XII are grossly intact, Motor strength normal upper and lower extremities, Sensory exam intact.  Psychiatric: Normal interaction, Coherent speech, Euthymic mood, Appropriate affect     Labs Reviewed:     Chemistry:  Lab Results   Component Value Date     06/06/2023    K 4.1 06/06/2023    CHLORIDE 105 06/06/2023    BUN 22.5 (H) 06/06/2023    CREATININE 0.75 06/06/2023    EGFRNORACEVR >60 06/06/2023    GLUCOSE 90 06/06/2023    CALCIUM 9.2 06/06/2023    ALKPHOS 64 06/06/2023    LABPROT 6.4 06/06/2023    ALBUMIN 3.5 " 06/06/2023    BILIDIR 0.1 06/04/2021    IBILI 0.20 06/04/2021    AST 17 06/06/2023    ALT 19 06/06/2023    GBAPZRUO56JI 54.8 06/06/2023    TSH 2.954 06/06/2023    JSCIBB7VUMV 0.89 06/04/2018        Lab Results   Component Value Date    HGBA1C 5.0 06/06/2023        Hematology:  Lab Results   Component Value Date    WBC 8.02 06/06/2023    HGB 11.3 (L) 06/06/2023    HCT 36.3 (L) 06/06/2023     06/06/2023       Lipid Panel:  Lab Results   Component Value Date    CHOL 182 06/06/2023    HDL 54 06/06/2023    LDL 95.00 06/06/2023    TRIG 165 (H) 06/06/2023    TOTALCHOLEST 3 06/06/2023        Urine:  Lab Results   Component Value Date    COLORUA Yellow 12/20/2022    APPEARANCEUA Clear 12/20/2022    SGUA 1.010 12/20/2022    PHUA 6.5 12/20/2022    PROTEINUA Negative 12/20/2022    GLUCOSEUA Negative 12/20/2022    KETONESUA Negative 12/20/2022    BLOODUA Trace-Intact (A) 12/20/2022    NITRITESUA Negative 12/20/2022    LEUKOCYTESUR Moderate (A) 12/20/2022    RBCUA 0-5 12/20/2022    WBCUA 11-20 (A) 12/20/2022    BACTERIA 1+ (A) 12/20/2022          Assessment:       ICD-10-CM ICD-9-CM   1. Dysuria  R30.0 788.1   2. Urinary tract infection without hematuria, site unspecified  N39.0 599.0        Plan:   1. Dysuria  UA positive for leukocytes, nitrites, and blood.  C&S ordered.  Start Cipro 500 mg p.o. b.i.d. x3 days.  Will call with results.  Increase fluid intake to 6-8 bottles daily.   - POCT URINE DIPSTICK WITHOUT MICROSCOPE  - Urinalysis, Reflex to Urine Culture      2. Urinary tract infection without hematuria, site unspecified  UA positive for leukocytes, nitrites, and blood.  C&S ordered.  Start Cipro 500 mg p.o. b.i.d. x3 days.  Will call with results.  Increase fluid intake to 6-8 bottles daily.   - POCT URINE DIPSTICK WITHOUT MICROSCOPE  - Urinalysis, Reflex to Urine Culture    Follow up if symptoms worsen or fail to improve. In addition to their scheduled follow up, the patient has also been instructed to follow up  on as needed basis.     Future Appointments   Date Time Provider Department Center   12/26/2023  2:45 PM Flor Henderson FNP [facility]   6/19/2024  9:30 AM Flor Henderson FNP [facility]   7/9/2024  8:45 AM Dixon Chino MD [facility]        SALAZAR Coughlin

## 2023-10-05 LAB — BACTERIA UR CULT: ABNORMAL

## 2023-12-11 DIAGNOSIS — E03.9 HYPOTHYROIDISM, UNSPECIFIED TYPE: Primary | ICD-10-CM

## 2024-01-02 ENCOUNTER — TELEPHONE (OUTPATIENT)
Dept: FAMILY MEDICINE | Facility: CLINIC | Age: 64
End: 2024-01-02
Payer: MEDICARE

## 2024-01-02 DIAGNOSIS — K21.9 GASTROESOPHAGEAL REFLUX DISEASE, UNSPECIFIED WHETHER ESOPHAGITIS PRESENT: Primary | ICD-10-CM

## 2024-01-02 DIAGNOSIS — J30.2 SEASONAL ALLERGIES: ICD-10-CM

## 2024-01-02 NOTE — TELEPHONE ENCOUNTER
----- Message from Alcon Holcomb LPN sent at 1/2/2024 11:12 AM CST -----  Please see below.     ----- Message -----  From: Julisa Edwards  Sent: 1/2/2024  10:17 AM CST  To: Santiago Ray Staff    .Type:  Patient Returning Call    Who Called:Leanne  Who Left Message for Patient:PT  Does the patient know what this is regarding?:well care insurance and medication prempro  Would the patient rather a call back or a response via [facility]?   Best Call Back Number:[phone]  Additional Information: Please call back about well care insurance and medication prempro and change to another medication that will be approved

## 2024-01-02 NOTE — TELEPHONE ENCOUNTER
I called patient I even called every number available and the number patient gave me to contact this insurance company each phone number they hung up on me or it was a dead end .  Patient has and appointment on 1/3/24 will get number off back of her card .

## 2024-01-03 RX ORDER — OMEPRAZOLE 20 MG/1
20 CAPSULE, DELAYED RELEASE ORAL DAILY
Qty: 30 CAPSULE | Refills: 11 | Status: SHIPPED | OUTPATIENT
Start: 2024-01-03 | End: 2024-01-04 | Stop reason: SDUPTHER

## 2024-01-03 RX ORDER — LEVOCETIRIZINE DIHYDROCHLORIDE 5 MG/1
5 TABLET, FILM COATED ORAL NIGHTLY
Qty: 30 TABLET | Refills: 11 | Status: SHIPPED | OUTPATIENT
Start: 2024-01-03 | End: 2024-01-04 | Stop reason: SDUPTHER

## 2024-01-04 ENCOUNTER — TELEPHONE (OUTPATIENT)
Dept: FAMILY MEDICINE | Facility: CLINIC | Age: 64
End: 2024-01-04
Payer: MEDICARE

## 2024-01-04 DIAGNOSIS — G89.29 CHRONIC PAIN OF LEFT KNEE: ICD-10-CM

## 2024-01-04 DIAGNOSIS — E78.00 HYPERCHOLESTEROLEMIA: ICD-10-CM

## 2024-01-04 DIAGNOSIS — D50.8 OTHER IRON DEFICIENCY ANEMIA: ICD-10-CM

## 2024-01-04 DIAGNOSIS — K21.9 GASTROESOPHAGEAL REFLUX DISEASE, UNSPECIFIED WHETHER ESOPHAGITIS PRESENT: ICD-10-CM

## 2024-01-04 DIAGNOSIS — M25.562 CHRONIC PAIN OF LEFT KNEE: ICD-10-CM

## 2024-01-04 DIAGNOSIS — J30.2 SEASONAL ALLERGIES: ICD-10-CM

## 2024-01-04 DIAGNOSIS — E03.9 HYPOTHYROIDISM, UNSPECIFIED TYPE: ICD-10-CM

## 2024-01-04 DIAGNOSIS — F41.9 ANXIETY: Primary | ICD-10-CM

## 2024-01-04 RX ORDER — OMEPRAZOLE 20 MG/1
20 CAPSULE, DELAYED RELEASE ORAL DAILY
Qty: 90 CAPSULE | Refills: 3 | Status: SHIPPED | OUTPATIENT
Start: 2024-01-04

## 2024-01-04 RX ORDER — VENLAFAXINE HYDROCHLORIDE 75 MG/1
75 CAPSULE, EXTENDED RELEASE ORAL DAILY
Qty: 90 CAPSULE | Refills: 3 | Status: SHIPPED | OUTPATIENT
Start: 2024-01-04

## 2024-01-04 RX ORDER — FOLIC ACID 1 MG/1
1 TABLET ORAL DAILY
Qty: 90 TABLET | Refills: 3 | Status: SHIPPED | OUTPATIENT
Start: 2024-01-04 | End: 2025-01-03

## 2024-01-04 RX ORDER — LOVASTATIN 40 MG/1
40 TABLET ORAL NIGHTLY
Qty: 90 TABLET | Refills: 3 | Status: SHIPPED | OUTPATIENT
Start: 2024-01-04

## 2024-01-04 RX ORDER — MELOXICAM 7.5 MG/1
TABLET ORAL
Qty: 90 TABLET | Refills: 3 | Status: SHIPPED | OUTPATIENT
Start: 2024-01-04

## 2024-01-04 RX ORDER — LEVOTHYROXINE SODIUM 50 UG/1
50 TABLET ORAL DAILY
Qty: 90 TABLET | Refills: 3 | Status: SHIPPED | OUTPATIENT
Start: 2024-01-04

## 2024-01-04 RX ORDER — LEVOCETIRIZINE DIHYDROCHLORIDE 5 MG/1
5 TABLET, FILM COATED ORAL NIGHTLY
Qty: 90 TABLET | Refills: 3 | Status: SHIPPED | OUTPATIENT
Start: 2024-01-04

## 2024-01-08 PROBLEM — N39.0 UTI (URINARY TRACT INFECTION): Status: RESOLVED | Noted: 2023-10-03 | Resolved: 2024-01-08

## 2024-01-18 ENCOUNTER — TELEPHONE (OUTPATIENT)
Dept: FAMILY MEDICINE | Facility: CLINIC | Age: 64
End: 2024-01-18
Payer: MEDICARE

## 2024-01-18 NOTE — TELEPHONE ENCOUNTER
----- Message from Thai Aguilar sent at 1/18/2024  1:54 PM CST -----  .Type:  Needs Medical Advice    Who Called: pt  Symptoms (please be specific):    How long has patient had these symptoms:    Pharmacy name and phone #:    Would the patient rather a call back or a response via [facility]? Call back  Best Call Back Number: [phone]  Additional Information: pt called wanting to speak with Santiago or Nurse

## 2024-01-22 ENCOUNTER — TELEPHONE (OUTPATIENT)
Dept: FAMILY MEDICINE | Facility: CLINIC | Age: 64
End: 2024-01-22
Payer: MEDICARE

## 2024-01-22 DIAGNOSIS — R05.9 COUGH, UNSPECIFIED TYPE: Primary | ICD-10-CM

## 2024-01-22 NOTE — TELEPHONE ENCOUNTER
----- Message from Miguel Goldstein sent at 1/22/2024  3:05 PM CST -----  .Type:  Needs Medical Advice    Who Called:   Symptoms (please be specific):    How long has patient had these symptoms:    Pharmacy name and phone #:    Would the patient rather a call back or a response via [facility]?   Best Call Back Number: [phone]  Additional Information: Patient [email or web address] would like Ms. Ray to call her back.

## 2024-01-23 ENCOUNTER — LAB VISIT (OUTPATIENT)
Dept: LAB | Facility: HOSPITAL | Age: 64
End: 2024-01-23
Attending: NURSE PRACTITIONER
Payer: MEDICARE

## 2024-01-23 DIAGNOSIS — R05.9 COUGH, UNSPECIFIED TYPE: ICD-10-CM

## 2024-01-23 LAB
FLUAV AG UPPER RESP QL IA.RAPID: NOT DETECTED
FLUBV AG UPPER RESP QL IA.RAPID: NOT DETECTED
RSV A 5' UTR RNA NPH QL NAA+PROBE: NOT DETECTED
SARS-COV-2 RNA RESP QL NAA+PROBE: NOT DETECTED
STREP A PCR (OHS): NOT DETECTED

## 2024-01-23 PROCEDURE — 87651 STREP A DNA AMP PROBE: CPT

## 2024-01-23 PROCEDURE — 0241U COVID/RSV/FLU A&B PCR: CPT

## 2024-01-23 RX ORDER — AZITHROMYCIN 250 MG/1
TABLET, FILM COATED ORAL
Qty: 6 TABLET | Refills: 0 | Status: SHIPPED | OUTPATIENT
Start: 2024-01-23 | End: 2024-01-28

## 2024-01-23 NOTE — TELEPHONE ENCOUNTER
Pt phoned and made aware of new medication and advised to follow-up if symptoms do not improve upon completion. UV.

## 2024-01-23 NOTE — TELEPHONE ENCOUNTER
Azithromycin five-day Z-Mahesh sent to pharmacy.  Please have patient follow-up if symptoms do not improve upon completion.

## 2024-01-31 DIAGNOSIS — D64.9 ANEMIA, UNSPECIFIED TYPE: Primary | ICD-10-CM

## 2024-01-31 RX ORDER — FERROUS SULFATE 324(65)MG
324 TABLET, DELAYED RELEASE (ENTERIC COATED) ORAL DAILY
Qty: 90 TABLET | Refills: 3 | Status: SHIPPED | OUTPATIENT
Start: 2024-01-31

## 2024-01-31 NOTE — TELEPHONE ENCOUNTER
----- Message from Kimberly Mckeon sent at 1/31/2024  1:53 PM CST -----  Pt has questions about kidney disease

## 2024-02-21 LAB — BCS RECOMMENDATION EXT: NORMAL

## 2024-05-04 ENCOUNTER — HOSPITAL ENCOUNTER (EMERGENCY)
Facility: HOSPITAL | Age: 64
Discharge: HOME | End: 2024-05-04
Attending: EMERGENCY MEDICINE
Payer: MEDICARE

## 2024-05-04 VITALS
TEMPERATURE: 98 F | SYSTOLIC BLOOD PRESSURE: 133 MMHG | OXYGEN SATURATION: 98 % | RESPIRATION RATE: 18 BRPM | BODY MASS INDEX: 30.12 KG/M2 | HEART RATE: 96 BPM | DIASTOLIC BLOOD PRESSURE: 74 MMHG | HEIGHT: 63 IN | WEIGHT: 170 LBS

## 2024-05-04 DIAGNOSIS — M54.6 ACUTE RIGHT-SIDED THORACIC BACK PAIN: Primary | ICD-10-CM

## 2024-05-04 DIAGNOSIS — R20.2 PARESTHESIA OF UPPER AND LOWER EXTREMITIES OF BOTH SIDES: ICD-10-CM

## 2024-05-04 PROCEDURE — 96372 THER/PROPH/DIAG INJ SC/IM: CPT | Performed by: EMERGENCY MEDICINE

## 2024-05-04 PROCEDURE — 99284 EMERGENCY DEPT VISIT MOD MDM: CPT | Mod: 25

## 2024-05-04 PROCEDURE — 63600175 PHARM REV CODE 636 W HCPCS: Performed by: EMERGENCY MEDICINE

## 2024-05-04 RX ORDER — METHOCARBAMOL 750 MG/1
1500 TABLET, FILM COATED ORAL 3 TIMES DAILY
Qty: 30 TABLET | Refills: 0 | Status: SHIPPED | OUTPATIENT
Start: 2024-05-04 | End: 2024-05-09

## 2024-05-04 RX ORDER — KETOROLAC TROMETHAMINE 30 MG/ML
30 INJECTION, SOLUTION INTRAMUSCULAR; INTRAVENOUS
Status: COMPLETED | OUTPATIENT
Start: 2024-05-04 | End: 2024-05-04

## 2024-05-04 RX ADMIN — KETOROLAC TROMETHAMINE 30 MG: 30 INJECTION, SOLUTION INTRAMUSCULAR at 11:05

## 2024-05-04 NOTE — ED PROVIDER NOTES
Encounter Date: 5/4/2024       History     Chief Complaint   Patient presents with    Back Pain     C/o lower back and R trunk pain that started suddenly yesterday; denies injury.       This 63-year-old female presents with complaints of intermittent numbness and tingling in her legs bilaterally and in her hands bilaterally.  She also is having pain in her right thoracic back that started yesterday.  She has had no injury.  She denies cough or fever or chills or dysuria or frequency.  She has a history of arthritis and sciatica.       Review of patient's allergies indicates:  No Known Allergies  Past Medical History:   Diagnosis Date    Anemia, unspecified     Arthritis     Chronic pain of left knee     Depression, unspecified depression type     GERD (gastroesophageal reflux disease)     Hypercholesterolemia     Hypothyroidism, unspecified type     Personal history of colonic polyps     Sciatica      Past Surgical History:   Procedure Laterality Date    CHOLECYSTECTOMY      COLONOSCOPY  02/09/2018    Dixon Sumi/Normal exam    OVARIAN CYST REMOVAL       No family history on file.  Social History     Tobacco Use    Smoking status: Never    Smokeless tobacco: Never   Substance Use Topics    Alcohol use: Never    Drug use: Never     Review of Systems   Constitutional:  Negative for fever.   HENT:  Negative for sore throat.    Respiratory:  Negative for shortness of breath.    Cardiovascular:  Negative for chest pain.   Gastrointestinal:  Negative for nausea.   Genitourinary:  Negative for dysuria.   Musculoskeletal:  Positive for arthralgias and back pain.   Skin:  Negative for rash.   Neurological:  Negative for weakness.   Hematological:  Does not bruise/bleed easily.       Physical Exam     Initial Vitals [05/04/24 1100]   BP Pulse Resp Temp SpO2   133/74 96 18 98 °F (36.7 °C) 98 %      MAP       --         Physical Exam    Nursing note and vitals reviewed.  Constitutional: She appears well-developed and  well-nourished.   HENT:   Head: Normocephalic and atraumatic.   Eyes: Conjunctivae and EOM are normal. Pupils are equal, round, and reactive to light.   Neck: Neck supple.   Normal range of motion.  Cardiovascular:  Normal rate, regular rhythm, normal heart sounds and intact distal pulses.           Pulmonary/Chest: Breath sounds normal.   Abdominal: Abdomen is soft. Bowel sounds are normal.   Musculoskeletal:         General: Tenderness (rhomboids on right) present. Normal range of motion.      Cervical back: Normal range of motion and neck supple.     Neurological: She is alert and oriented to person, place, and time. She has normal strength.   Negative straight leg raise   Skin: Skin is warm and dry. Capillary refill takes less than 2 seconds.   Psychiatric: She has a normal mood and affect. Her behavior is normal. Judgment and thought content normal.         ED Course   Procedures  Labs Reviewed - No data to display       Imaging Results    None          Medications   ketorolac injection 30 mg (30 mg Intramuscular Given 5/4/24 8537)     Medical Decision Making  Risk  Prescription drug management.                                      Clinical Impression:  Final diagnoses:  [M54.6] Acute right-sided thoracic back pain (Primary)  [R20.2] Paresthesia of upper and lower extremities of both sides          ED Disposition Condition    Discharge Stable          ED Prescriptions       Medication Sig Dispense Start Date End Date Auth. Provider    methocarbamoL (ROBAXIN) 750 MG Tab Take 2 tablets (1,500 mg total) by mouth 3 (three) times daily. for 5 days 30 tablet 5/4/2024 5/9/2024 Gregory Weinberg MD          Follow-up Information       Follow up With Specialties Details Why Contact Info    Flor Henderson, FNP Family Medicine Schedule an appointment as soon as possible for a visit  As needed [id number] [facility] [address]  [phone]               Gregory Weinberg MD  05/04/24 4563

## 2024-05-27 ENCOUNTER — PATIENT OUTREACH (OUTPATIENT)
Facility: CLINIC | Age: 64
End: 2024-05-27
Payer: MEDICARE

## 2024-05-27 NOTE — PROGRESS NOTES
Population Health Outreach. Health Maintenance Topic(s) Outreach Outcomes & Actions Taken:    Breast Cancer Screening - Outreach Outcomes & Actions Taken  : External Records Uploaded & Care Team Updated if Applicable         Additional Notes:  [facility] [place] [facility] Mammogram 02.21.24    Updated Outside Records and Immunizations.

## 2024-06-10 DIAGNOSIS — Z00.00 WELLNESS EXAMINATION: Primary | ICD-10-CM

## 2024-06-18 ENCOUNTER — LAB VISIT (OUTPATIENT)
Dept: LAB | Facility: HOSPITAL | Age: 64
End: 2024-06-18
Attending: NURSE PRACTITIONER
Payer: MEDICARE

## 2024-06-18 DIAGNOSIS — Z00.00 WELLNESS EXAMINATION: ICD-10-CM

## 2024-06-18 DIAGNOSIS — E03.9 HYPOTHYROIDISM, UNSPECIFIED TYPE: ICD-10-CM

## 2024-06-18 LAB
25(OH)D3+25(OH)D2 SERPL-MCNC: 51 NG/ML (ref 30–80)
ALBUMIN SERPL-MCNC: 3.9 G/DL (ref 3.4–4.8)
ALBUMIN/GLOB SERPL: 1.4 RATIO (ref 1.1–2)
ALP SERPL-CCNC: 101 UNIT/L (ref 40–150)
ALT SERPL-CCNC: 42 UNIT/L (ref 0–55)
ANION GAP SERPL CALC-SCNC: 6 MEQ/L
AST SERPL-CCNC: 32 UNIT/L (ref 5–34)
BASOPHILS # BLD AUTO: 0.04 X10(3)/MCL
BASOPHILS NFR BLD AUTO: 0.5 %
BILIRUB SERPL-MCNC: 0.5 MG/DL
BUN SERPL-MCNC: 24.6 MG/DL (ref 9.8–20.1)
CALCIUM SERPL-MCNC: 10.2 MG/DL (ref 8.4–10.2)
CHLORIDE SERPL-SCNC: 108 MMOL/L (ref 98–107)
CHOLEST SERPL-MCNC: 213 MG/DL
CHOLEST/HDLC SERPL: 4 {RATIO} (ref 0–5)
CO2 SERPL-SCNC: 29 MMOL/L (ref 23–31)
CREAT SERPL-MCNC: 0.8 MG/DL (ref 0.55–1.02)
CREAT/UREA NIT SERPL: 31
EOSINOPHIL # BLD AUTO: 0.27 X10(3)/MCL (ref 0–0.9)
EOSINOPHIL NFR BLD AUTO: 3.6 %
ERYTHROCYTE [DISTWIDTH] IN BLOOD BY AUTOMATED COUNT: 12.9 % (ref 11.5–17)
EST. AVERAGE GLUCOSE BLD GHB EST-MCNC: 99.7 MG/DL
GFR SERPLBLD CREATININE-BSD FMLA CKD-EPI: >60 ML/MIN/1.73/M2
GLOBULIN SER-MCNC: 2.8 GM/DL (ref 2.4–3.5)
GLUCOSE SERPL-MCNC: 89 MG/DL (ref 82–115)
HBA1C MFR BLD: 5.1 %
HCT VFR BLD AUTO: 36.6 % (ref 37–47)
HDLC SERPL-MCNC: 56 MG/DL (ref 35–60)
HGB BLD-MCNC: 11.8 G/DL (ref 12–16)
IMM GRANULOCYTES # BLD AUTO: 0.01 X10(3)/MCL (ref 0–0.04)
IMM GRANULOCYTES NFR BLD AUTO: 0.1 %
LDLC SERPL CALC-MCNC: 127 MG/DL (ref 50–140)
LYMPHOCYTES # BLD AUTO: 2.23 X10(3)/MCL (ref 0.6–4.6)
LYMPHOCYTES NFR BLD AUTO: 29.9 %
MCH RBC QN AUTO: 31.7 PG (ref 27–31)
MCHC RBC AUTO-ENTMCNC: 32.2 G/DL (ref 33–36)
MCV RBC AUTO: 98.4 FL (ref 80–94)
MONOCYTES # BLD AUTO: 0.68 X10(3)/MCL (ref 0.1–1.3)
MONOCYTES NFR BLD AUTO: 9.1 %
NEUTROPHILS # BLD AUTO: 4.23 X10(3)/MCL (ref 2.1–9.2)
NEUTROPHILS NFR BLD AUTO: 56.8 %
PLATELET # BLD AUTO: 373 X10(3)/MCL (ref 130–400)
PMV BLD AUTO: 9.6 FL (ref 7.4–10.4)
POTASSIUM SERPL-SCNC: 4.5 MMOL/L (ref 3.5–5.1)
PROT SERPL-MCNC: 6.7 GM/DL (ref 5.8–7.6)
RBC # BLD AUTO: 3.72 X10(6)/MCL (ref 4.2–5.4)
SODIUM SERPL-SCNC: 143 MMOL/L (ref 136–145)
TRIGL SERPL-MCNC: 152 MG/DL (ref 37–140)
TSH SERPL-ACNC: 3.08 UIU/ML (ref 0.35–4.94)
VLDLC SERPL CALC-MCNC: 30 MG/DL
WBC # BLD AUTO: 7.46 X10(3)/MCL (ref 4.5–11.5)

## 2024-06-18 PROCEDURE — 84443 ASSAY THYROID STIM HORMONE: CPT

## 2024-06-18 PROCEDURE — 85025 COMPLETE CBC W/AUTO DIFF WBC: CPT

## 2024-06-18 PROCEDURE — 83036 HEMOGLOBIN GLYCOSYLATED A1C: CPT

## 2024-06-18 PROCEDURE — 80061 LIPID PANEL: CPT

## 2024-06-18 PROCEDURE — 80053 COMPREHEN METABOLIC PANEL: CPT

## 2024-06-18 PROCEDURE — 36415 COLL VENOUS BLD VENIPUNCTURE: CPT

## 2024-06-18 PROCEDURE — 82306 VITAMIN D 25 HYDROXY: CPT

## 2024-06-19 ENCOUNTER — OFFICE VISIT (OUTPATIENT)
Dept: FAMILY MEDICINE | Facility: CLINIC | Age: 64
End: 2024-06-19
Payer: MEDICARE

## 2024-06-19 VITALS
BODY MASS INDEX: 32.85 KG/M2 | OXYGEN SATURATION: 100 % | HEART RATE: 86 BPM | WEIGHT: 178.5 LBS | TEMPERATURE: 98 F | SYSTOLIC BLOOD PRESSURE: 116 MMHG | HEIGHT: 62 IN | DIASTOLIC BLOOD PRESSURE: 71 MMHG

## 2024-06-19 DIAGNOSIS — Z00.00 WELLNESS EXAMINATION: Primary | ICD-10-CM

## 2024-06-19 DIAGNOSIS — E78.2 MIXED HYPERLIPIDEMIA: ICD-10-CM

## 2024-06-19 DIAGNOSIS — Z78.0 POSTMENOPAUSAL: ICD-10-CM

## 2024-06-19 DIAGNOSIS — G62.9 NEUROPATHY: ICD-10-CM

## 2024-06-19 DIAGNOSIS — E66.9 OBESITY, UNSPECIFIED CLASSIFICATION, UNSPECIFIED OBESITY TYPE, UNSPECIFIED WHETHER SERIOUS COMORBIDITY PRESENT: ICD-10-CM

## 2024-06-19 PROCEDURE — G0438 PPPS, INITIAL VISIT: HCPCS | Mod: ,,, | Performed by: NURSE PRACTITIONER

## 2024-06-19 PROCEDURE — 1101F PT FALLS ASSESS-DOCD LE1/YR: CPT | Mod: ,,, | Performed by: NURSE PRACTITIONER

## 2024-06-19 PROCEDURE — 3044F HG A1C LEVEL LT 7.0%: CPT | Mod: ,,, | Performed by: NURSE PRACTITIONER

## 2024-06-19 PROCEDURE — 1125F AMNT PAIN NOTED PAIN PRSNT: CPT | Mod: ,,, | Performed by: NURSE PRACTITIONER

## 2024-06-19 PROCEDURE — 1160F RVW MEDS BY RX/DR IN RCRD: CPT | Mod: ,,, | Performed by: NURSE PRACTITIONER

## 2024-06-19 PROCEDURE — 3288F FALL RISK ASSESSMENT DOCD: CPT | Mod: ,,, | Performed by: NURSE PRACTITIONER

## 2024-06-19 PROCEDURE — 1159F MED LIST DOCD IN RCRD: CPT | Mod: ,,, | Performed by: NURSE PRACTITIONER

## 2024-06-19 PROCEDURE — 3078F DIAST BP <80 MM HG: CPT | Mod: ,,, | Performed by: NURSE PRACTITIONER

## 2024-06-19 PROCEDURE — 3074F SYST BP LT 130 MM HG: CPT | Mod: ,,, | Performed by: NURSE PRACTITIONER

## 2024-06-19 RX ORDER — GABAPENTIN 300 MG/1
300 CAPSULE ORAL NIGHTLY
Qty: 30 CAPSULE | Refills: 0 | Status: SHIPPED | OUTPATIENT
Start: 2024-06-19 | End: 2025-06-19

## 2024-06-19 NOTE — PROGRESS NOTES
Patient ID: [id number]     Chief Complaint: Wellness     HPI:   Leanne Hahn is a 63 y.o. female here today for an annual wellness visit. She presents with complaints of numbness to bilateral hands and feet. Denies any trauma or injury.       Health Maintenance   Topic Date Due    Hepatitis C Screening  Never done    DEXA Scan  Never done    TETANUS VACCINE  11/04/2019    Mammogram  02/21/2025    Colorectal Cancer Screening  02/09/2028    Lipid Panel  06/18/2029    Shingles Vaccine  Completed       Dental Exam - Recommend biannually  Vaccinations -   Immunization History   Administered Date(s) Administered    COVID-19 Vaccine 03/30/2021, 04/27/2021, 11/17/2021, 04/12/2022    COVID-19, MRNA, LN-S, PF (MODERNA FULL 0.5 ML DOSE) 03/30/2021, 04/27/2021, 11/17/2021, 04/12/2022    COVID-19, mRNA, LNP-S, PF (Moderna 2023)Ages 12+ 10/13/2023    Influenza 11/04/2009, 01/07/2014, 12/05/2019    Influenza - Quadrivalent - PF *Preferred* (6 months and older) 09/16/2020, 09/27/2021, 10/04/2022, 10/09/2023    Influenza - Trivalent - PF (ADULT) 10/23/2018, 12/05/2019    MMR 10/16/2023    Rsv, Bivalent, Rsvpref (Abrysvo) 10/16/2023    Td (ADULT) 11/04/2009    Td - PF (ADULT) 11/04/2009    Zoster Recombinant 12/12/2018, 03/12/2019        Past Medical History:  has a past medical history of Anemia, unspecified, Arthritis, Chronic pain of left knee, Depression, unspecified depression type, GERD (gastroesophageal reflux disease), Hypercholesterolemia, Hypothyroidism, unspecified type, Personal history of colonic polyps, and Sciatica.    Surgical History:  has a past surgical history that includes Ovarian cyst removal; Cholecystectomy; and Colonoscopy (02/09/2018).    Family History: family history is not on file.    Social History:  reports that she has never smoked. She has never used smokeless tobacco. She reports that she does not drink alcohol and does not use drugs.    Current Outpatient Medications   Medication Instructions     "ergocalciferol (vitamin D2) 2,000 Units, Oral, Daily    estrogen, conjugated,-medroxyprogesterone 0.625-2.5mg (PREMPRO) 0.625-2.5 mg per tablet 1 tablet, Oral, Daily    ferrous sulfate 324 mg, Oral, Daily    folic acid (FOLVITE) 1 mg, Oral, Daily    gabapentin (NEURONTIN) 300 mg, Oral, Nightly    levocetirizine (XYZAL) 5 mg, Oral, Nightly    levothyroxine (EUTHYROX) 50 mcg, Oral, Daily    lovastatin (MEVACOR) 40 mg, Oral, Nightly    meloxicam (MOBIC) 7.5 MG tablet See Instructions, TAKE 1 TABLET BY MOUTH ONCE DAILY AS NEEDED FOR PAIN    omeprazole (PRILOSEC) 20 mg, Oral, Daily    venlafaxine (EFFEXOR-XR) 75 mg, Oral, Daily       Patient has No Known Allergies.     Patient Care Team:  Flor Henderson FNP as PCP - General (Nurse Practitioner)  Dixon Chino MD as Consulting Physician (Obstetrics and Gynecology)  Selena Pina LPN as Care Coordinator  [facility] [place] [place] (Radiology)  Dixon Jonas MD (Surgery)       Subjective:     Review of Systems    12 point review of systems conducted, negative except as stated in the history of present illness. See HPI for details.      Objective:     Visit Vitals  /71   Pulse 86   Temp 98.1 °F (36.7 °C)   Ht 5' 2" (1.575 m)   Wt 81 kg (178 lb 8 oz)   LMP  (LMP Unknown)   SpO2 100%   BMI 32.65 kg/m²       Physical Exam    General: Alert and oriented, No acute distress.  Head: Normocephalic, Atraumatic.  Eye: Pupils are equal, round and reactive to light, Extraocular movements are intact, Sclera non-icteric.  Ears/Nose/Throat: Normal, Mucosa moist,Clear.  Neck/Thyroid: Supple, Non-tender, No carotid bruit, No lymphadenopathy, No JVD, Full range of motion.  Respiratory: Clear to auscultation bilaterally; No wheezes, rales or rhonchi,Non-labored respirations, Symmetrical chest wall expansion.  Cardiovascular: Regular rate and rhythm, S1/S2 normal, No murmurs, rubs or gallops.  Gastrointestinal: Soft, Non-tender, Non-distended, Normal bowel sounds, No palpable " organomegaly.  Musculoskeletal: Normal range of motion.  Integumentary: Warm, Dry, Intact, No suspicious lesions or rashes.  Extremities: No clubbing, cyanosis or edema  Neurologic: No focal deficits, Cranial Nerves II-XII are grossly intact, Motor strength normal upper and lower extremities, Sensory exam intact.  Psychiatric: Normal interaction, Coherent speech, Euthymic mood, Appropriate affect     Labs Reviewed:     Chemistry:  Lab Results   Component Value Date     06/18/2024    K 4.5 06/18/2024    BUN 24.6 (H) 06/18/2024    CREATININE 0.80 06/18/2024    EGFRNORACEVR >60 06/18/2024    GLUCOSE 89 06/18/2024    CALCIUM 10.2 06/18/2024    ALKPHOS 101 06/18/2024    LABPROT 6.7 06/18/2024    ALBUMIN 3.9 06/18/2024    BILIDIR 0.1 06/04/2021    IBILI 0.20 06/04/2021    AST 32 06/18/2024    ALT 42 06/18/2024    [id number] 51 06/18/2024    TSH 3.082 06/18/2024    OVQKBP1XFVH 0.89 06/04/2018        Lab Results   Component Value Date    HGBA1C 5.1 06/18/2024        Hematology:  Lab Results   Component Value Date    WBC 7.46 06/18/2024    HGB 11.8 (L) 06/18/2024    HCT 36.6 (L) 06/18/2024     06/18/2024       Lipid Panel:  Lab Results   Component Value Date    CHOL 213 (H) 06/18/2024    HDL 56 06/18/2024    .00 06/18/2024    TRIG 152 (H) 06/18/2024    TOTALCHOLEST 4 06/18/2024        Urine:  Lab Results   Component Value Date    APPEARANCEUA Slightly Cloudy (A) 10/03/2023    SGUA 1.015 10/03/2023    PROTEINUA Negative 10/03/2023    KETONESUA Negative 10/03/2023    LEUKOCYTESUR Small (A) 10/03/2023    RBCUA 0-2 10/03/2023    WBCUA 11-20 (A) 10/03/2023    BACTERIA Few (A) 10/03/2023          Assessment:       ICD-10-CM ICD-9-CM   1. Wellness examination  Z00.00 V70.0   2. Mixed hyperlipidemia  E78.2 272.2   3. Neuropathy  G62.9 355.9   4. Postmenopausal  Z78.0 V49.81   5. Obesity, unspecified classification, unspecified obesity type, unspecified whether serious comorbidity present  E66.9 278.00         Plan:   1. Wellness examination  Healthy Lifestyle discussed.     2. Mixed hyperlipidemia  Lab Results   Component Value Date    .00 06/18/2024    TRIG 152 (H) 06/18/2024    HDL 56 06/18/2024    TOTALCHOLEST 4 06/18/2024     Continue   Hyperlipidemia Medications               lovastatin (MEVACOR) 40 MG tablet Take 1 tablet (40 mg total) by mouth nightly.        Stressed importance of dietary modifications. Follow a low cholesterol, low saturated fat diet with less that 200mg of cholesterol a day.  Avoid fried foods and high saturated fats (high saturated fats less than 7% of calories).  Add Flax Seed/Fish Oil supplements to diet. Increase dietary fiber.  Regular exercise can reduce LDL and raise HDL. Stressed importance of physical activity 5 times per week for 30 minutes per day.     3. Neuropathy  Not at goal. Start gabapentin 300 mg po at bedtime.   - gabapentin (NEURONTIN) 300 MG capsule; Take 1 capsule (300 mg total) by mouth every evening.  Dispense: 30 capsule; Refill: 0    4. Postmenopausal  - DXA Bone Density Axial Skeleton 1 or more sites; Future    5. Obesity, unspecified classification, unspecified obesity type, unspecified whether serious comorbidity present  Body mass index is 32.65 kg/m².  Goal BMI <30.  Exercise 5 times a week for 30 minutes per day.  Avoid soda, simple sugars, excessive rice, potatoes or bread. Limit fast foods and fried foods.  Choose complex carbs in moderation (example: green vegetables, beans, oatmeal). Eat plenty of fresh fruits and vegetables with lean meats daily.  Do not skip meals. Eat a balanced portion size.  Avoid fad diets. Consider permanent healthy life style changes.       I offered to discuss advanced care planning, including how to pick a person who would make decisions for you if you were unable to make them for yourself, called a health care power of , and what kind of decisions you might make such as use of life sustaining treatments such as  ventilators and tube feeding when faced with a life limiting illness.      Follow up in about 6 months (around 12/19/2024) for HTN. In addition to their scheduled follow up, the patient has also been instructed to follow up on as needed basis.     Future Appointments   Date Time Provider Department Center   6/27/2024 10:00 AM [facility] DEXA1 300 LB LIMIT [facility] XRAY [facility]   7/9/2024  8:45 AM Dixon Chino MD [facility]   12/26/2024 10:00 AM Flor Henderson FNP [facility]   6/25/2025 10:00 AM Flor Henderson FNP [facility]        SALAZAR Coughlin

## 2024-06-27 ENCOUNTER — TELEPHONE (OUTPATIENT)
Dept: FAMILY MEDICINE | Facility: CLINIC | Age: 64
End: 2024-06-27
Payer: MEDICARE

## 2024-06-27 ENCOUNTER — HOSPITAL ENCOUNTER (OUTPATIENT)
Dept: RADIOLOGY | Facility: HOSPITAL | Age: 64
Discharge: HOME | End: 2024-06-27
Attending: NURSE PRACTITIONER
Payer: MEDICARE

## 2024-06-27 DIAGNOSIS — Z78.0 POSTMENOPAUSAL: ICD-10-CM

## 2024-06-27 PROCEDURE — 77080 DXA BONE DENSITY AXIAL: CPT | Mod: TC

## 2024-06-27 NOTE — TELEPHONE ENCOUNTER
Pt aware. Understanding voiced.   ----- Message from SALAZAR Coughlin sent at 6/27/2024  1:16 PM CDT -----  DEXA scan shows normal bone density.

## 2024-07-10 ENCOUNTER — HOSPITAL ENCOUNTER (OUTPATIENT)
Dept: RADIOLOGY | Facility: HOSPITAL | Age: 64
Discharge: HOME | End: 2024-07-10
Attending: NURSE PRACTITIONER
Payer: MEDICARE

## 2024-07-10 ENCOUNTER — OFFICE VISIT (OUTPATIENT)
Dept: FAMILY MEDICINE | Facility: CLINIC | Age: 64
End: 2024-07-10
Payer: MEDICARE

## 2024-07-10 VITALS
RESPIRATION RATE: 18 BRPM | HEART RATE: 97 BPM | TEMPERATURE: 98 F | WEIGHT: 181.63 LBS | BODY MASS INDEX: 33.43 KG/M2 | OXYGEN SATURATION: 97 % | SYSTOLIC BLOOD PRESSURE: 128 MMHG | DIASTOLIC BLOOD PRESSURE: 70 MMHG | HEIGHT: 62 IN

## 2024-07-10 DIAGNOSIS — M54.10 RADICULOPATHY, UNSPECIFIED SPINAL REGION: Primary | ICD-10-CM

## 2024-07-10 DIAGNOSIS — M54.12 CERVICAL RADICULOPATHY: ICD-10-CM

## 2024-07-10 DIAGNOSIS — M54.32 BILATERAL SCIATICA: ICD-10-CM

## 2024-07-10 DIAGNOSIS — M54.31 BILATERAL SCIATICA: ICD-10-CM

## 2024-07-10 DIAGNOSIS — M54.10 RADICULOPATHY, UNSPECIFIED SPINAL REGION: ICD-10-CM

## 2024-07-10 PROCEDURE — 72110 X-RAY EXAM L-2 SPINE 4/>VWS: CPT | Mod: TC

## 2024-07-10 PROCEDURE — 1125F AMNT PAIN NOTED PAIN PRSNT: CPT | Mod: ,,, | Performed by: NURSE PRACTITIONER

## 2024-07-10 PROCEDURE — 1159F MED LIST DOCD IN RCRD: CPT | Mod: ,,, | Performed by: NURSE PRACTITIONER

## 2024-07-10 PROCEDURE — 72050 X-RAY EXAM NECK SPINE 4/5VWS: CPT | Mod: TC

## 2024-07-10 PROCEDURE — 99214 OFFICE O/P EST MOD 30 MIN: CPT | Mod: ,,, | Performed by: NURSE PRACTITIONER

## 2024-07-10 PROCEDURE — 1101F PT FALLS ASSESS-DOCD LE1/YR: CPT | Mod: ,,, | Performed by: NURSE PRACTITIONER

## 2024-07-10 PROCEDURE — 3074F SYST BP LT 130 MM HG: CPT | Mod: ,,, | Performed by: NURSE PRACTITIONER

## 2024-07-10 PROCEDURE — 3008F BODY MASS INDEX DOCD: CPT | Mod: ,,, | Performed by: NURSE PRACTITIONER

## 2024-07-10 PROCEDURE — 3288F FALL RISK ASSESSMENT DOCD: CPT | Mod: ,,, | Performed by: NURSE PRACTITIONER

## 2024-07-10 PROCEDURE — 3044F HG A1C LEVEL LT 7.0%: CPT | Mod: ,,, | Performed by: NURSE PRACTITIONER

## 2024-07-10 PROCEDURE — 1160F RVW MEDS BY RX/DR IN RCRD: CPT | Mod: ,,, | Performed by: NURSE PRACTITIONER

## 2024-07-10 PROCEDURE — 3078F DIAST BP <80 MM HG: CPT | Mod: ,,, | Performed by: NURSE PRACTITIONER

## 2024-07-10 RX ORDER — DICLOFENAC SODIUM 50 MG/1
50 TABLET, DELAYED RELEASE ORAL 2 TIMES DAILY PRN
Qty: 60 TABLET | Refills: 6 | Status: SHIPPED | OUTPATIENT
Start: 2024-07-10

## 2024-07-10 RX ORDER — GABAPENTIN 100 MG/1
100 CAPSULE ORAL EVERY MORNING
Qty: 30 CAPSULE | Refills: 11 | Status: SHIPPED | OUTPATIENT
Start: 2024-07-10 | End: 2025-07-10

## 2024-07-10 NOTE — PROGRESS NOTES
Patient ID: [id number]     Chief Complaint: Referral      HPI:     Leanne Hahn is a 63 y.o. female here today with complaints of numbness to bilateral upper extremities.  Symptoms are exacerbated while driving and holding the steering wheel.  Denies trauma or injury.  Patient rates pain 10/10 in office.  Patient denies relief with meloxicam 7.5 mg p.o. daily.  Patient also presents with complaints of bilateral sciatic nerve pain.  Patient reports pain begins to buttocks and radiates down to bilateral knees and down into her feet.  Denies any falls.  Patient reports symptoms have been present for at least one-month.    Past Medical History:  has a past medical history of Anemia, unspecified, Arthritis, Chronic pain of left knee, Depression, unspecified depression type, GERD (gastroesophageal reflux disease), Hypercholesterolemia, Hypothyroidism, unspecified type, Neuropathy, Personal history of colonic polyps, and Sciatica.    Surgical History:  has a past surgical history that includes Ovarian cyst removal; Cholecystectomy; and Colonoscopy (02/09/2018).    Family History: family history is not on file.    Social History:  reports that she has never smoked. She has never used smokeless tobacco. She reports that she does not drink alcohol and does not use drugs.    Current Outpatient Medications   Medication Instructions    diclofenac (VOLTAREN) 50 mg, Oral, 2 times daily PRN    ergocalciferol (vitamin D2) 2,000 Units, Oral, Daily    estrogen, conjugated,-medroxyprogesterone 0.625-2.5mg (PREMPRO) 0.625-2.5 mg per tablet 1 tablet, Oral, Daily    ferrous sulfate 324 mg, Oral, Daily    folic acid (FOLVITE) 1 mg, Oral, Daily    gabapentin (NEURONTIN) 300 mg, Oral, Nightly    gabapentin (NEURONTIN) 100 mg, Oral, Every morning    levocetirizine (XYZAL) 5 mg, Oral, Nightly    levothyroxine (EUTHYROX) 50 mcg, Oral, Daily    lovastatin (MEVACOR) 40 mg, Oral, Nightly    omeprazole (PRILOSEC) 20 mg, Oral, Daily    venlafaxine  "(EFFEXOR-XR) 75 mg, Oral, Daily       Patient has No Known Allergies.     Patient Care Team:  Flor Henderson FNP as PCP - General (Nurse Practitioner)  Dixon Chino MD as Consulting Physician (Obstetrics and Gynecology)  Selena Pina LPN as Care Coordinator  Imaging, [facility] [place] [facility] (Radiology)  Dixon Jonas MD (Surgery)       Subjective:     Review of Systems    12 point review of systems conducted, negative except as stated in the history of present illness. See HPI for details.      Objective:     Visit Vitals  /70 (BP Location: Left arm, Patient Position: Sitting)   Pulse 97   Temp 98.1 °F (36.7 °C) (Oral)   Resp 18   Ht 5' 2.21" (1.58 m)   Wt 82.4 kg (181 lb 9.6 oz)   LMP  (LMP Unknown)   SpO2 97%   BMI 33.00 kg/m²       Physical Exam    General: Alert and oriented, No acute distress.  Head: Normocephalic, Atraumatic.  Eye: Pupils are equal, round and reactive to light, Extraocular movements are intact, Sclera non-icteric.  Ears/Nose/Throat: Normal, Mucosa moist,Clear.  Neck/Thyroid: Supple, Non-tender, No carotid bruit, No lymphadenopathy, No JVD, Full range of motion.  Respiratory: Clear to auscultation bilaterally; No wheezes, rales or rhonchi,Non-labored respirations, Symmetrical chest wall expansion.  Cardiovascular: Regular rate and rhythm, S1/S2 normal, No murmurs, rubs or gallops.  Gastrointestinal: Soft, Non-tender, Non-distended, Normal bowel sounds, No palpable organomegaly.  Musculoskeletal:  Decreased range of motion to bilateral lower extremities,   Integumentary: Warm, Dry, Intact, No suspicious lesions or rashes.  Extremities: No clubbing, cyanosis or edema  Neurologic: No focal deficits, Cranial Nerves II-XII are grossly intact, Motor strength normal upper and lower extremities, impaired sensation to bilateral hands.  Psychiatric: Normal interaction, Coherent speech, Euthymic mood, Appropriate affect     Labs Reviewed:     Chemistry:  Lab Results   Component Value Date     " 06/18/2024    K 4.5 06/18/2024    BUN 24.6 (H) 06/18/2024    CREATININE 0.80 06/18/2024    EGFRNORACEVR >60 06/18/2024    GLUCOSE 89 06/18/2024    CALCIUM 10.2 06/18/2024    ALKPHOS 101 06/18/2024    LABPROT 6.7 06/18/2024    ALBUMIN 3.9 06/18/2024    BILIDIR 0.1 06/04/2021    IBILI 0.20 06/04/2021    AST 32 06/18/2024    ALT 42 06/18/2024    [id number] 51 06/18/2024    TSH 3.082 06/18/2024    NKGGMB8QBQK 0.89 06/04/2018        Lab Results   Component Value Date    HGBA1C 5.1 06/18/2024        Hematology:  Lab Results   Component Value Date    WBC 7.46 06/18/2024    HGB 11.8 (L) 06/18/2024    HCT 36.6 (L) 06/18/2024     06/18/2024       Lipid Panel:  Lab Results   Component Value Date    CHOL 213 (H) 06/18/2024    HDL 56 06/18/2024    .00 06/18/2024    TRIG 152 (H) 06/18/2024    TOTALCHOLEST 4 06/18/2024        Urine:  Lab Results   Component Value Date    APPEARANCEUA Slightly Cloudy (A) 10/03/2023    SGUA 1.015 10/03/2023    PROTEINUA Negative 10/03/2023    KETONESUA Negative 10/03/2023    LEUKOCYTESUR Small (A) 10/03/2023    RBCUA 0-2 10/03/2023    WBCUA 11-20 (A) 10/03/2023    BACTERIA Few (A) 10/03/2023          Assessment:       ICD-10-CM ICD-9-CM   1. Cervical radiculopathy  M54.12 723.4   2. Radiculopathy, unspecified spinal region  M54.10 729.2   3. Bilateral sciatica  M54.31 724.3    M54.32         Plan:     1. Radiculopathy, unspecified spinal region  Gabapentin 100 mg p.o. every morning added to current regimen.  Continue gabapentin 300 mg p.o. at bedtime.  DC meloxicam.  Start diclofenac 50 mg p.o. b.i.d. p.r.n. pain.  X-ray of C-spine and L-spine ordered.  Referral sent to PT.  Will call with results.  - X-Ray Cervical Spine Complete 5 view; Future  - gabapentin (NEURONTIN) 100 MG capsule; Take 1 capsule (100 mg total) by mouth every morning.  Dispense: 30 capsule; Refill: 11    2. Bilateral sciatica  Gabapentin 100 mg p.o. every morning added to current regimen.  Continue gabapentin  300 mg p.o. at bedtime.  DC meloxicam.  Start diclofenac 50 mg p.o. b.i.d. p.r.n. pain.  X-ray of C-spine and L-spine ordered.  Referral sent to PT.  Will call with results.  - X-Ray Lumbar Spine 5 View; Future       3. Cervical radiculopathy  Gabapentin 100 mg p.o. every morning added to current regimen.  Continue gabapentin 300 mg p.o. at bedtime.  DC meloxicam.  Start diclofenac 50 mg p.o. b.i.d. p.r.n. pain.  X-ray of C-spine and L-spine ordered.  Referral sent to PT.  Will call with results.  - X-Ray Cervical Spine Complete 5 view; Future  - gabapentin (NEURONTIN) 100 MG capsule; Take 1 capsule (100 mg total) by mouth every morning.  Dispense: 30 capsule; Refill: 11  - Ambulatory referral/consult to Physical/Occupational Therapy; Future    Follow up if symptoms worsen or fail to improve. In addition to their scheduled follow up, the patient has also been instructed to follow up on as needed basis.     Future Appointments   Date Time Provider Department Center   12/26/2024 10:00 AM Flor Henderson FNP [facility]   6/25/2025 10:00 AM Flor Henderson FNP [facility]   7/15/2025  9:45 AM [place] [facility] [place]        SALAZAR Coughlin

## 2024-07-16 ENCOUNTER — TELEPHONE (OUTPATIENT)
Dept: FAMILY MEDICINE | Facility: CLINIC | Age: 64
End: 2024-07-16
Payer: MEDICARE

## 2024-07-16 NOTE — TELEPHONE ENCOUNTER
----- Message from SALAZAR Coughlin sent at 7/16/2024 12:19 PM CDT -----  Regarding: RE: test results  Spoke to pt. Results given.  ----- Message -----  From: Alcon Holcomb LPN  Sent: 7/16/2024  10:50 AM CDT  To: SALAZAR Coughlin  Subject: FW: test results                                 See below, please advise.  ----- Message -----  From: Km Olvera  Sent: 7/16/2024  10:39 AM CDT  To: [facility] Staff  Subject: test results                                     Who Called: Leanne Hahn    Caller is requesting information on test results.    Name of Test (Lab/Mammo/Etc): Xray  Date of Test: 07/10  Where the test was performed: [facility]  Ordering Provider:     Preferred Method of Contact: Phone Call  Patient's Preferred Phone Number on File: [phone]   Best Call Back Number, if different:  Additional Information: Pt is requesting a phone call in regards to the results of her Xray, stated she has an appt for 1pm with her therapist and needs copies of Xray.

## 2024-08-05 ENCOUNTER — OFFICE VISIT (OUTPATIENT)
Dept: FAMILY MEDICINE | Facility: CLINIC | Age: 64
End: 2024-08-05
Payer: MEDICARE

## 2024-08-05 VITALS
HEART RATE: 82 BPM | SYSTOLIC BLOOD PRESSURE: 119 MMHG | TEMPERATURE: 98 F | WEIGHT: 180.88 LBS | OXYGEN SATURATION: 98 % | RESPIRATION RATE: 18 BRPM | DIASTOLIC BLOOD PRESSURE: 79 MMHG | BODY MASS INDEX: 33.29 KG/M2 | HEIGHT: 62 IN

## 2024-08-05 DIAGNOSIS — J06.9 UPPER RESPIRATORY TRACT INFECTION, UNSPECIFIED TYPE: Primary | ICD-10-CM

## 2024-08-05 PROCEDURE — 99214 OFFICE O/P EST MOD 30 MIN: CPT | Mod: 25,,, | Performed by: NURSE PRACTITIONER

## 2024-08-05 PROCEDURE — 96372 THER/PROPH/DIAG INJ SC/IM: CPT | Mod: ,,, | Performed by: NURSE PRACTITIONER

## 2024-08-05 PROCEDURE — 3008F BODY MASS INDEX DOCD: CPT | Mod: ,,, | Performed by: NURSE PRACTITIONER

## 2024-08-05 PROCEDURE — 3288F FALL RISK ASSESSMENT DOCD: CPT | Mod: ,,, | Performed by: NURSE PRACTITIONER

## 2024-08-05 PROCEDURE — 1101F PT FALLS ASSESS-DOCD LE1/YR: CPT | Mod: ,,, | Performed by: NURSE PRACTITIONER

## 2024-08-05 PROCEDURE — 3044F HG A1C LEVEL LT 7.0%: CPT | Mod: ,,, | Performed by: NURSE PRACTITIONER

## 2024-08-05 PROCEDURE — 1126F AMNT PAIN NOTED NONE PRSNT: CPT | Mod: ,,, | Performed by: NURSE PRACTITIONER

## 2024-08-05 PROCEDURE — 3074F SYST BP LT 130 MM HG: CPT | Mod: ,,, | Performed by: NURSE PRACTITIONER

## 2024-08-05 PROCEDURE — 3078F DIAST BP <80 MM HG: CPT | Mod: ,,, | Performed by: NURSE PRACTITIONER

## 2024-08-05 RX ORDER — AZITHROMYCIN 250 MG/1
TABLET, FILM COATED ORAL
Qty: 6 TABLET | Refills: 0 | Status: SHIPPED | OUTPATIENT
Start: 2024-08-05 | End: 2024-08-10

## 2024-08-05 RX ORDER — CETIRIZINE HYDROCHLORIDE 10 MG/1
10 TABLET ORAL DAILY
Qty: 30 TABLET | Refills: 3 | Status: SHIPPED | OUTPATIENT
Start: 2024-08-05 | End: 2025-08-05

## 2024-08-05 RX ORDER — BETAMETHASONE SODIUM PHOSPHATE AND BETAMETHASONE ACETATE 3; 3 MG/ML; MG/ML
6 INJECTION, SUSPENSION INTRA-ARTICULAR; INTRALESIONAL; INTRAMUSCULAR; SOFT TISSUE
Status: COMPLETED | OUTPATIENT
Start: 2024-08-05 | End: 2024-08-05

## 2024-08-05 RX ADMIN — BETAMETHASONE SODIUM PHOSPHATE AND BETAMETHASONE ACETATE 6 MG: 3; 3 INJECTION, SUSPENSION INTRA-ARTICULAR; INTRALESIONAL; INTRAMUSCULAR; SOFT TISSUE at 03:08

## 2024-08-05 NOTE — PROGRESS NOTES
Betamethasone acetate-betamethasone sodium phosphate 6 mg injection administered per order, pt orquidea well.

## 2024-08-13 ENCOUNTER — HOSPITAL ENCOUNTER (EMERGENCY)
Facility: HOSPITAL | Age: 64
Discharge: HOME | End: 2024-08-13
Attending: FAMILY MEDICINE
Payer: MEDICARE

## 2024-08-13 VITALS
TEMPERATURE: 98 F | WEIGHT: 172 LBS | RESPIRATION RATE: 18 BRPM | OXYGEN SATURATION: 97 % | HEART RATE: 95 BPM | SYSTOLIC BLOOD PRESSURE: 156 MMHG | DIASTOLIC BLOOD PRESSURE: 107 MMHG | BODY MASS INDEX: 27 KG/M2 | HEIGHT: 67 IN

## 2024-08-13 DIAGNOSIS — M79.89 PAIN AND SWELLING OF RIGHT LOWER EXTREMITY: ICD-10-CM

## 2024-08-13 DIAGNOSIS — M79.604 PAIN AND SWELLING OF RIGHT LOWER EXTREMITY: ICD-10-CM

## 2024-08-13 DIAGNOSIS — M54.31 SCIATICA OF RIGHT SIDE: Primary | ICD-10-CM

## 2024-08-13 PROCEDURE — 25000003 PHARM REV CODE 250: Performed by: NURSE PRACTITIONER

## 2024-08-13 PROCEDURE — 63600175 PHARM REV CODE 636 W HCPCS: Performed by: NURSE PRACTITIONER

## 2024-08-13 PROCEDURE — 96372 THER/PROPH/DIAG INJ SC/IM: CPT | Performed by: NURSE PRACTITIONER

## 2024-08-13 PROCEDURE — 99285 EMERGENCY DEPT VISIT HI MDM: CPT | Mod: 25

## 2024-08-13 RX ORDER — DEXAMETHASONE SODIUM PHOSPHATE 4 MG/ML
8 INJECTION, SOLUTION INTRA-ARTICULAR; INTRALESIONAL; INTRAMUSCULAR; INTRAVENOUS; SOFT TISSUE
Status: COMPLETED | OUTPATIENT
Start: 2024-08-13 | End: 2024-08-13

## 2024-08-13 RX ORDER — METHOCARBAMOL 500 MG/1
500 TABLET, FILM COATED ORAL 3 TIMES DAILY PRN
Qty: 15 TABLET | Refills: 0 | Status: SHIPPED | OUTPATIENT
Start: 2024-08-13 | End: 2024-08-18

## 2024-08-13 RX ORDER — METHOCARBAMOL 500 MG/1
1000 TABLET, FILM COATED ORAL
Status: COMPLETED | OUTPATIENT
Start: 2024-08-13 | End: 2024-08-13

## 2024-08-13 RX ORDER — HYDROCODONE BITARTRATE AND ACETAMINOPHEN 5; 325 MG/1; MG/1
1 TABLET ORAL
Status: COMPLETED | OUTPATIENT
Start: 2024-08-13 | End: 2024-08-13

## 2024-08-13 RX ADMIN — HYDROCODONE BITARTRATE AND ACETAMINOPHEN 1 TABLET: 5; 325 TABLET ORAL at 02:08

## 2024-08-13 RX ADMIN — METHOCARBAMOL 1000 MG: 500 TABLET ORAL at 02:08

## 2024-08-13 RX ADMIN — DEXAMETHASONE SODIUM PHOSPHATE 8 MG: 4 INJECTION, SOLUTION INTRA-ARTICULAR; INTRALESIONAL; INTRAMUSCULAR; INTRAVENOUS; SOFT TISSUE at 02:08

## 2024-08-13 NOTE — ED PROVIDER NOTES
"Encounter Date: 8/13/2024       History     Chief Complaint   Patient presents with    Leg Pain     C/o R posterior thigh pain; described as intermittent spasms.      63 year old female presents to ER complaining of "muscle cramps" to her right posterior leg.  Patient states she has been going to physical therapy for back pain for about a week.  She states over the last 2 days she has developed a cramp in her right posterior thigh.  She states the pain travels from the posterior thigh down her leg to her foot.  She denies any known injury.  She has had no loss of bowel or bladder function.  Review of past records show that patient has neuropathy to both upper and lower extremities.    The history is provided by the patient. No  was used.     Review of patient's allergies indicates:  No Known Allergies  Past Medical History:   Diagnosis Date    Anemia, unspecified     Arthritis     Chronic pain of left knee     Depression, unspecified depression type     GERD (gastroesophageal reflux disease)     Hypercholesterolemia     Hypothyroidism, unspecified type     Neuropathy     Personal history of colonic polyps     Sciatica      Past Surgical History:   Procedure Laterality Date    CHOLECYSTECTOMY      COLONOSCOPY  02/09/2018    Dixon Sumi/Normal exam    OVARIAN CYST REMOVAL       No family history on file.  Social History     Tobacco Use    Smoking status: Never    Smokeless tobacco: Never   Substance Use Topics    Alcohol use: Never    Drug use: Never     Review of Systems   Constitutional:  Negative for chills and fever.   Respiratory:  Negative for shortness of breath.    Cardiovascular:  Negative for chest pain and leg swelling.   Musculoskeletal:  Positive for arthralgias, back pain and myalgias.   Skin:  Negative for color change, rash and wound.   Neurological:  Negative for numbness.   All other systems reviewed and are negative.      Physical Exam     Initial Vitals [08/13/24 1400]   BP Pulse " Resp Temp SpO2   (!) 156/107 95 18 98.2 °F (36.8 °C) 97 %      MAP       --         Physical Exam    Nursing note and vitals reviewed.  Constitutional: She appears well-developed and well-nourished.   HENT:   Head: Normocephalic.   Eyes: EOM are normal.   Neck: Neck supple.   Cardiovascular:  Normal rate, regular rhythm and intact distal pulses.           Pulmonary/Chest: No respiratory distress.   Abdominal: She exhibits no distension.   Musculoskeletal:         General: Tenderness present. Normal range of motion.      Cervical back: Neck supple.        Legs:      Neurological: She is alert and oriented to person, place, and time.   Skin: Skin is warm and dry. Capillary refill takes less than 2 seconds.   Psychiatric: She has a normal mood and affect.         ED Course   Procedures  Labs Reviewed - No data to display       Imaging Results              US Lower Extremity Veins Right (In process)  Result time 08/13/24 15:02:23                     Medications   dexAMETHasone injection 8 mg (8 mg Intramuscular Given 8/13/24 1406)   HYDROcodone-acetaminophen 5-325 mg per tablet 1 tablet (1 tablet Oral Given 8/13/24 1406)   methocarbamoL tablet 1,000 mg (1,000 mg Oral Given 8/13/24 1406)     Medical Decision Making  DDX: lumbar radiculopathy, sciatica, DVT    63 year old female complaining of right LE pain. US negative for DVT. No ecchymosis, erythema or rash. X-rays of lumbar spine from 7/10/24 reveals degenerative changes of lumbar spine. She is currently being treated for lumbar radiculopathy by PCP. She is in PT. Decadron IM in ER with Robaxin and hydrocodone. Will discharge home with Robaxin and have her f/u with PCP.     Amount and/or Complexity of Data Reviewed  External Data Reviewed: radiology.     Details: Reading Physician Reading Date Result Priority  Sade Berry MD  [phone] 7/10/2024 Routine    Narrative & Impression  EXAMINATION:  XR LUMBAR SPINE COMPLETE 5 VIEW     CLINICAL  HISTORY:  Sciatica, right side     COMPARISON:  None.     FINDINGS:  The lowest fully formed disc space is labeled as L5-S1.  There is grade 1 anterolisthesis of L4 and L5.  The vertebral body heights are maintained.  There is moderate disc height loss at L1-L2 and L3-L4 with small marginal osteophytes.  There is moderate to severe facet arthropathy.  There is no evidence of spondylolysis.  The soft tissues are unremarkable.     Impression:     1. No acute abnormality identified.  2. Multilevel degenerative changes of the lumbar spine.        Electronically signed by:Sade Berry  Date:                                            07/10/2024  Time:                                           12:57    Radiology: ordered. Decision-making details documented in ED Course.     Details: US negative for DVT per Evangelista DELA CRUZ RT    Risk  Prescription drug management.                                      Clinical Impression:  Final diagnoses:  [M79.604, M79.89] Pain and swelling of right lower extremity  [M54.31] Sciatica of right side (Primary)          ED Disposition Condition    Discharge Stable          ED Prescriptions       Medication Sig Dispense Start Date End Date Auth. Provider    methocarbamoL (ROBAXIN) 500 MG Tab Take 1 tablet (500 mg total) by mouth 3 (three) times daily as needed (muscle spasms). 15 tablet 8/13/2024 8/18/2024 Starla Ponce FNP          Follow-up Information    None          Starla Ponce FNP  08/13/24 [id number]

## 2024-08-13 NOTE — DISCHARGE INSTRUCTIONS
Methocarbamol three times a day as needed for pain, may cause drowsiness  Follow up with your doctor

## 2024-08-17 NOTE — PROGRESS NOTES
Patient ID: [id number]     Chief Complaint: Sinus Problem      HPI:     Leanne Hahn is a 63 y.o. female here today with complaints of nonproductive cough, nasal congestion, sinus pressure and HA x1 week.  Patient denies fever, chest pain, or myalgia.  Patient has tried NyQuil and Tylenol without relief.    Past Medical History:  has a past medical history of Anemia, unspecified, Arthritis, Chronic pain of left knee, Depression, unspecified depression type, GERD (gastroesophageal reflux disease), Hypercholesterolemia, Hypothyroidism, unspecified type, Neuropathy, Personal history of colonic polyps, and Sciatica.    Surgical History:  has a past surgical history that includes Ovarian cyst removal; Cholecystectomy; and Colonoscopy (02/09/2018).    Family History: family history is not on file.    Social History:  reports that she has never smoked. She has never used smokeless tobacco. She reports that she does not drink alcohol and does not use drugs.    Current Outpatient Medications   Medication Instructions    cetirizine (ZYRTEC) 10 mg, Oral, Daily    diclofenac (VOLTAREN) 50 mg, Oral, 2 times daily PRN    ergocalciferol (vitamin D2) 2,000 Units, Oral, Daily    estrogen, conjugated,-medroxyprogesterone 0.625-2.5mg (PREMPRO) 0.625-2.5 mg per tablet 1 tablet, Oral, Daily    ferrous sulfate 324 mg, Oral, Daily    folic acid (FOLVITE) 1 mg, Oral, Daily    gabapentin (NEURONTIN) 300 mg, Oral, Nightly    gabapentin (NEURONTIN) 100 mg, Oral, Every morning    levothyroxine (EUTHYROX) 50 mcg, Oral, Daily    lovastatin (MEVACOR) 40 mg, Oral, Nightly    methocarbamoL (ROBAXIN) 500 mg, Oral, 3 times daily PRN    omeprazole (PRILOSEC) 20 mg, Oral, Daily    venlafaxine (EFFEXOR-XR) 75 mg, Oral, Daily       Patient has No Known Allergies.     Patient Care Team:  Flor Henderson FNP as PCP - General (Nurse Practitioner)  Dixon Chino MD as Consulting Physician (Obstetrics and Gynecology)  Selena Pina LPN as Care  "Coordinator  Imaging, [facility] [place] (Radiology)  Dixon Jonas MD (Surgery)       Subjective:     Review of Systems    12 point review of systems conducted, negative except as stated in the history of present illness. See HPI for details.      Objective:     Visit Vitals  /79 (BP Location: Left arm, Patient Position: Sitting)   Pulse 82   Temp 98 °F (36.7 °C) (Oral)   Resp 18   Ht 5' 2.21" (1.58 m)   Wt 82.1 kg (180 lb 14.4 oz)   LMP  (LMP Unknown)   SpO2 98%   BMI 32.87 kg/m²       Physical Exam    General: Alert and oriented, No acute distress.  Head: Normocephalic, Atraumatic.  Eye: Pupils are equal, round and reactive to light, Extraocular movements are intact, Sclera non-icteric.  Ears/Nose/Throat: Normal, Mucosa moist,Clear.  Neck/Thyroid: Supple, Non-tender, No carotid bruit, No lymphadenopathy, No JVD, Full range of motion.  Respiratory: Clear to auscultation bilaterally; No wheezes, rales or rhonchi,Non-labored respirations, Symmetrical chest wall expansion.  Cardiovascular: Regular rate and rhythm, S1/S2 normal, No murmurs, rubs or gallops.  Gastrointestinal: Soft, Non-tender, Non-distended, Normal bowel sounds, No palpable organomegaly.  Musculoskeletal: Normal range of motion.  Integumentary: Warm, Dry, Intact, No suspicious lesions or rashes.  Extremities: No clubbing, cyanosis or edema  Neurologic: No focal deficits, Cranial Nerves II-XII are grossly intact, Motor strength normal upper and lower extremities, Sensory exam intact.  Psychiatric: Normal interaction, Coherent speech, Euthymic mood, Appropriate affect     Labs Reviewed:     Chemistry:  Lab Results   Component Value Date     06/18/2024    K 4.5 06/18/2024    BUN 24.6 (H) 06/18/2024    CREATININE 0.80 06/18/2024    EGFRNORACEVR >60 06/18/2024    GLUCOSE 89 06/18/2024    CALCIUM 10.2 06/18/2024    ALKPHOS 101 06/18/2024    LABPROT 6.7 06/18/2024    ALBUMIN 3.9 06/18/2024    BILIDIR 0.1 06/04/2021    IBILI 0.20 06/04/2021    AST 32 06/18/2024 "    ALT 42 06/18/2024    [id number] 51 06/18/2024    TSH 3.082 06/18/2024    DOAJUD2DXTV 0.89 06/04/2018        Lab Results   Component Value Date    HGBA1C 5.1 06/18/2024        Hematology:  Lab Results   Component Value Date    WBC 7.46 06/18/2024    HGB 11.8 (L) 06/18/2024    HCT 36.6 (L) 06/18/2024     06/18/2024       Lipid Panel:  Lab Results   Component Value Date    CHOL 213 (H) 06/18/2024    HDL 56 06/18/2024    .00 06/18/2024    TRIG 152 (H) 06/18/2024    TOTALCHOLEST 4 06/18/2024        Urine:  Lab Results   Component Value Date    APPEARANCEUA Slightly Cloudy (A) 10/03/2023    SGUA 1.015 10/03/2023    PROTEINUA Negative 10/03/2023    KETONESUA Negative 10/03/2023    LEUKOCYTESUR Small (A) 10/03/2023    RBCUA 0-2 10/03/2023    WBCUA 11-20 (A) 10/03/2023    BACTERIA Few (A) 10/03/2023          Assessment:       ICD-10-CM ICD-9-CM   1. Upper respiratory tract infection, unspecified type  J06.9 465.9        Plan:   1. Upper respiratory tract infection, unspecified type  Start  Use OTC cold meds for symptoms (HTN and DM pt to avoid Sudafed or pseudoephedrine products).  Use OTC Tylenol or Advil for fever or body aches.  Get plenty of rest, eat a healthy diet, avoid alcohol and smoking.  Sore Throat: Use OTC lozenges or throat sprays, gargle with warm salt and water, warm tea with honey.  Nasal Congestion: Use OTC Mucinex D, humidifier or steamy shower.  Cough: Use Dextromethorphan/Doxylamine Cough Syrup at night.  Report fever greater than 101 F, breathing problems, painful or worsening cough, or changes in mucous (green, yellow, bloody).  Cover your mouth with coughing, avoid sharing cups or lip balm, wash your hand before eating or touching your face.    - betamethasone acetate-betamethasone sodium phosphate injection 6 mg  - azithromycin (Z-EDA) 250 MG tablet; Take 2 tablets by mouth on day 1; Take 1 tablet by mouth on days 2-5  Dispense: 6 tablet; Refill: 0         Follow up if symptoms  worsen or fail to improve. In addition to their scheduled follow up, the patient has also been instructed to follow up on as needed basis.     Future Appointments   Date Time Provider Department Center   12/26/2024 10:00 AM Flor Henderson FNP [facility]   6/25/2025 10:00 AM Flor Henderson FNP [facility]   7/15/2025  9:45 AM Dixon Chino MD [facility]        SALAZAR Coughlin

## 2024-08-30 ENCOUNTER — TELEPHONE (OUTPATIENT)
Dept: FAMILY MEDICINE | Facility: CLINIC | Age: 64
End: 2024-08-30
Payer: MEDICARE

## 2024-08-30 NOTE — TELEPHONE ENCOUNTER
----- Message from Evelin Truong sent at 8/30/2024 10:06 AM CDT -----  Regarding: med advice  .Who Called: Leanne Hahn    Caller is requesting assistance/information from provider's office.    Symptoms (please be specific): right leg pain    How long has patient had these symptoms:  2 weeks   List of preferred pharmacies on file (remove unneeded): [unfilled]  If different, enter pharmacy into here including location and phone number: [facility] in [place]       Preferred Method of Contact: Phone Call  Patient's Preferred Phone Number on File: [phone]   Best Call Back Number, if different:  Additional Information: pt would like for an xray or an US to be done on her right due the pain that she's been experiencing, she also stated she's been the [facility] ER was given pain medicine but it's not working,  please advise

## 2024-09-06 ENCOUNTER — OFFICE VISIT (OUTPATIENT)
Dept: FAMILY MEDICINE | Facility: CLINIC | Age: 64
End: 2024-09-06
Payer: MEDICARE

## 2024-09-06 VITALS
SYSTOLIC BLOOD PRESSURE: 136 MMHG | DIASTOLIC BLOOD PRESSURE: 79 MMHG | TEMPERATURE: 98 F | BODY MASS INDEX: 28.67 KG/M2 | OXYGEN SATURATION: 100 % | HEART RATE: 76 BPM | WEIGHT: 182.69 LBS | HEIGHT: 67 IN

## 2024-09-06 DIAGNOSIS — M54.31 SCIATICA OF RIGHT SIDE: Primary | ICD-10-CM

## 2024-09-06 PROCEDURE — 3075F SYST BP GE 130 - 139MM HG: CPT | Mod: ,,, | Performed by: NURSE PRACTITIONER

## 2024-09-06 PROCEDURE — 1126F AMNT PAIN NOTED NONE PRSNT: CPT | Mod: ,,, | Performed by: NURSE PRACTITIONER

## 2024-09-06 PROCEDURE — 3288F FALL RISK ASSESSMENT DOCD: CPT | Mod: ,,, | Performed by: NURSE PRACTITIONER

## 2024-09-06 PROCEDURE — 3044F HG A1C LEVEL LT 7.0%: CPT | Mod: ,,, | Performed by: NURSE PRACTITIONER

## 2024-09-06 PROCEDURE — 3008F BODY MASS INDEX DOCD: CPT | Mod: ,,, | Performed by: NURSE PRACTITIONER

## 2024-09-06 PROCEDURE — 1160F RVW MEDS BY RX/DR IN RCRD: CPT | Mod: ,,, | Performed by: NURSE PRACTITIONER

## 2024-09-06 PROCEDURE — 1101F PT FALLS ASSESS-DOCD LE1/YR: CPT | Mod: ,,, | Performed by: NURSE PRACTITIONER

## 2024-09-06 PROCEDURE — 99214 OFFICE O/P EST MOD 30 MIN: CPT | Mod: ,,, | Performed by: NURSE PRACTITIONER

## 2024-09-06 PROCEDURE — 3078F DIAST BP <80 MM HG: CPT | Mod: ,,, | Performed by: NURSE PRACTITIONER

## 2024-09-06 PROCEDURE — 1159F MED LIST DOCD IN RCRD: CPT | Mod: ,,, | Performed by: NURSE PRACTITIONER

## 2024-09-06 RX ORDER — PREDNISONE 20 MG/1
20 TABLET ORAL DAILY
Qty: 5 TABLET | Refills: 0 | Status: SHIPPED | OUTPATIENT
Start: 2024-09-06

## 2024-09-06 NOTE — PROGRESS NOTES
Patient ID: [id number]     Chief Complaint: er out patient f/u  (8/13/24 er visit for sciatic pain on right side leg, patient states it starts in morning continues through out day , easing up some. Patient is to do mri 09/16/24 and in PT. 2 times a week )      HPI:     Leanne Hahn is a 63 y.o. female here today for an ED follow up for right leg sciatica.  Patient reports symptoms remain present.  Patient rates pain 10/10 in office.  Patient is scheduled to have a MRI on September 16th.      Past Medical History:  has a past medical history of Anemia, unspecified, Arthritis, Chronic pain of left knee, Depression, unspecified depression type, GERD (gastroesophageal reflux disease), Hypercholesterolemia, Hypothyroidism, unspecified type, Neuropathy, Personal history of colonic polyps, and Sciatica.    Surgical History:  has a past surgical history that includes Ovarian cyst removal; Cholecystectomy; and Colonoscopy (02/09/2018).    Family History: family history is not on file.    Social History:  reports that she has never smoked. She has never used smokeless tobacco. She reports that she does not drink alcohol and does not use drugs.    Current Outpatient Medications   Medication Instructions    cetirizine (ZYRTEC) 10 mg, Oral, Daily    diclofenac (VOLTAREN) 50 mg, Oral, 2 times daily PRN    ergocalciferol (vitamin D2) 2,000 Units, Oral, Daily    ferrous sulfate 324 mg, Oral, Daily    folic acid (FOLVITE) 1 mg, Oral, Daily    gabapentin (NEURONTIN) 300 mg, Oral, Nightly    gabapentin (NEURONTIN) 100 mg, Oral, Every morning    levothyroxine (EUTHYROX) 50 mcg, Oral, Daily    lovastatin (MEVACOR) 40 mg, Oral, Nightly    omeprazole (PRILOSEC) 20 mg, Oral, Daily    predniSONE (DELTASONE) 20 mg, Oral, Daily    venlafaxine (EFFEXOR-XR) 75 mg, Oral, Daily       Patient has No Known Allergies.     Patient Care Team:  Flor Henderson FNP as PCP - General (Nurse Practitioner)  Dixon Chino MD as Consulting Physician  "(Obstetrics and Gynecology)  Selena Pina LPN as Care Coordinator  Imaging, [facility] [place] (Radiology)  Dixon Jonas MD (Surgery)       Subjective:     Review of Systems    12 point review of systems conducted, negative except as stated in the history of present illness. See HPI for details.      Objective:     Visit Vitals  /79   Pulse 76   Temp 98.1 °F (36.7 °C)   Ht 5' 7" (1.702 m)   Wt 82.9 kg (182 lb 11.2 oz)   LMP  (LMP Unknown)   SpO2 100%   BMI 28.61 kg/m²       Physical Exam    General: Alert and oriented, No acute distress.  Head: Normocephalic, Atraumatic.  Eye: Pupils are equal, round and reactive to light, Extraocular movements are intact, Sclera non-icteric.  Ears/Nose/Throat: Normal, Mucosa moist,Clear.  Neck/Thyroid: Supple, Non-tender, No carotid bruit, No lymphadenopathy, No JVD, Full range of motion.  Respiratory: Clear to auscultation bilaterally; No wheezes, rales or rhonchi,Non-labored respirations, Symmetrical chest wall expansion.  Cardiovascular: Regular rate and rhythm, S1/S2 normal, No murmurs, rubs or gallops.  Gastrointestinal: Soft, Non-tender, Non-distended, Normal bowel sounds, No palpable organomegaly.  Musculoskeletal:  Decreased range of motion to right lower extremity, limping gait.  Integumentary: Warm, Dry, Intact, No suspicious lesions or rashes.  Extremities: No clubbing, cyanosis or edema  Neurologic: No focal deficits, Cranial Nerves II-XII are grossly intact, Motor strength normal upper and lower extremities, Sensory exam intact.  Psychiatric: Normal interaction, Coherent speech, Euthymic mood, Appropriate affect     Labs Reviewed:     Chemistry:  Lab Results   Component Value Date     06/18/2024    K 4.5 06/18/2024    BUN 24.6 (H) 06/18/2024    CREATININE 0.80 06/18/2024    EGFRNORACEVR >60 06/18/2024    GLUCOSE 89 06/18/2024    CALCIUM 10.2 06/18/2024    ALKPHOS 101 06/18/2024    LABPROT 6.7 06/18/2024    ALBUMIN 3.9 06/18/2024    BILIDIR 0.1 06/04/2021    IBILI " 0.20 06/04/2021    AST 32 06/18/2024    ALT 42 06/18/2024    UWSWOGCB27ML 51 06/18/2024    TSH 3.082 06/18/2024    VQAKUS0ZDKN 0.89 06/04/2018        Lab Results   Component Value Date    HGBA1C 5.1 06/18/2024        Hematology:  Lab Results   Component Value Date    WBC 7.46 06/18/2024    HGB 11.8 (L) 06/18/2024    HCT 36.6 (L) 06/18/2024     06/18/2024       Lipid Panel:  Lab Results   Component Value Date    CHOL 213 (H) 06/18/2024    HDL 56 06/18/2024    .00 06/18/2024    TRIG 152 (H) 06/18/2024    TOTALCHOLEST 4 06/18/2024        Urine:  Lab Results   Component Value Date    APPEARANCEUA Slightly Cloudy (A) 10/03/2023    SGUA 1.015 10/03/2023    PROTEINUA Negative 10/03/2023    KETONESUA Negative 10/03/2023    LEUKOCYTESUR Small (A) 10/03/2023    RBCUA 0-2 10/03/2023    WBCUA 11-20 (A) 10/03/2023    BACTERIA Few (A) 10/03/2023          Assessment:       ICD-10-CM ICD-9-CM   1. Sciatica of right side  M54.31 724.3        Plan:     1. Sciatica of right side  Prednisone 20 mg p.o. daily x5 days sent to pharmacy.  Notify clinic if symptoms do not improve upon completion.  Continue physical therapy as ordered.  - predniSONE (DELTASONE) 20 MG tablet; Take 1 tablet (20 mg total) by mouth once daily.  Dispense: 5 tablet; Refill: 0      Follow up if symptoms worsen or fail to improve. In addition to their scheduled follow up, the patient has also been instructed to follow up on as needed basis.     Future Appointments   Date Time Provider Department Center   12/26/2024 10:00 AM Flor Henderson FNP [facility]   6/25/2025 10:00 AM Flor Henderson FNP [facility]   7/15/2025  9:45 AM Matti, Dixon P., MD MANN Henderson, P

## 2024-09-13 ENCOUNTER — TELEPHONE (OUTPATIENT)
Dept: FAMILY MEDICINE | Facility: CLINIC | Age: 64
End: 2024-09-13
Payer: MEDICARE

## 2024-09-13 NOTE — TELEPHONE ENCOUNTER
Pt stating she was informed of an appt for an MRI on 9/16/2024 @ 2pm. Informed pt no orders noted. Pt denies any pain at present. States she is still performing PT. Advised pt to follow up in clinic if pain continues. Understanding voiced.  ----- Message from Stone Downey sent at 9/13/2024 10:15 AM CDT -----  Regarding: advice  Who Called: Leanne Hahn    Caller is requesting assistance/information from provider's office.    Symptoms (please be specific):   How long has patient had these symptoms:    List of preferred pharmacies on file (remove unneeded): [unfilled]  If different, enter pharmacy into here including location and phone number:       Preferred Method of Contact: Phone Call  Patient's Preferred Phone Number on File: [phone]   Best Call Back Number, if different:  Additional Information:  Pt Is calling in regards to an MRI scan she got a call about being scheduled on Monday 9/16, but there are no orders in pt's chart. Please contact patient about MRI. The MRI was discussed at recent appointment following ER visit.

## 2024-09-16 ENCOUNTER — TELEPHONE (OUTPATIENT)
Dept: FAMILY MEDICINE | Facility: CLINIC | Age: 64
End: 2024-09-16

## 2024-09-16 DIAGNOSIS — M54.16 LUMBAR RADICULOPATHY, CHRONIC: Primary | ICD-10-CM

## 2024-09-16 NOTE — TELEPHONE ENCOUNTER
----- Message from Sarah Nemesiokendall sent at 9/16/2024  8:36 AM CDT -----  Regarding: advice  Who Called: Leanne Hahn    Caller is requesting assistance/information from provider's office.    Symptoms (please be specific):    How long has patient had these symptoms:    List of preferred pharmacies on file (remove unneeded): [unfilled]  If different, enter pharmacy into here including location and phone number:       Preferred Method of Contact: Phone Call  Patient's Preferred Phone Number on File: [phone]   Best Call Back Number, if different:  Additional Information: stated that she is suppose to have an US done on her leg this morning, but no appt is scheduled. Message was sent Friday about having an MRI. No orders were created for either test. Please contact pt on what is needing to be done. Please advise

## 2024-09-25 ENCOUNTER — HOSPITAL ENCOUNTER (OUTPATIENT)
Dept: RADIOLOGY | Facility: HOSPITAL | Age: 64
Discharge: HOME | End: 2024-09-25
Attending: NURSE PRACTITIONER
Payer: MEDICARE

## 2024-09-25 DIAGNOSIS — M54.16 LUMBAR RADICULOPATHY, CHRONIC: ICD-10-CM

## 2024-09-25 PROCEDURE — 72148 MRI LUMBAR SPINE W/O DYE: CPT | Mod: TC

## 2024-10-01 ENCOUNTER — TELEPHONE (OUTPATIENT)
Dept: FAMILY MEDICINE | Facility: CLINIC | Age: 64
End: 2024-10-01
Payer: MEDICARE

## 2024-10-01 DIAGNOSIS — M51.369 BULGING LUMBAR DISC: Primary | ICD-10-CM

## 2024-10-01 DIAGNOSIS — M43.06 LUMBAR SPONDYLOLYSIS: ICD-10-CM

## 2024-10-01 NOTE — TELEPHONE ENCOUNTER
----- Message from SALAZAR Lewis sent at 10/1/2024  3:14 PM CDT -----  Regarding: RE: Results  Spoke to patient.  Results given.  Referral sent to neurosurgery.  ----- Message -----  From: Alcon Holcomb LPN  Sent: 10/1/2024   2:03 PM CDT  To: SALAZAR Coughlin  Subject: Results                                          See below. Please advise.  ----- Message -----  From: Justine Dickey  Sent: 10/1/2024   1:32 PM CDT  To: Santiago Ray Staff    Who Called: Leanne Hahn    Caller is requesting information on test results.    Name of Test (Lab/Mammo/Etc): MRI   Date of Test: 9/25  Where the test was performed:   Ordering Provider: SALAZAR Coughlin    Preferred Method of Contact: Phone Call  Patient's Preferred Phone Number on File: [phone]   Best Call Back Number, if different:  Additional Information:

## 2024-10-03 ENCOUNTER — TELEPHONE (OUTPATIENT)
Dept: NEUROSURGERY | Facility: CLINIC | Age: 64
End: 2024-10-03
Payer: MEDICARE

## 2024-10-03 DIAGNOSIS — M47.816 LUMBAR SPONDYLOSIS: Primary | ICD-10-CM

## 2024-10-03 NOTE — TELEPHONE ENCOUNTER
Please schedule the patient next available on either my or Elinor's schedule. Please have the patient complete Flex/Ext views of the lumbar spine prior to the visit. I have entered the orders. Thanks

## 2024-10-03 NOTE — TELEPHONE ENCOUNTER
The patient is being referred to Dr. Brooks by nurse practitioner Flor Henderson for lumbar spondylosis.  The patient states that in August she began with right low back pain that radiates down her right leg (stops at her ankle.)  She states that it hurts, it feels like nerve pain, and cold when she touches her leg.  She also states that it is hard to walk when she wakes in the morning.  She denies any bladder or bowel incontinence.  She went to the ER on 8/13/24, went to physical therapy at [facility] with Seth Saldivar, and seen her PC for these symptoms.  She stopped physical therapy due to the pain.  She denies having any injections or previous spine surgeries.  The patients MRI lumbar on 9/25/24 states multiple level disc protrusions what compresses the ventral thecal sac.  Please advise on how to schedule the patient.  Thanks BH

## 2024-10-04 NOTE — TELEPHONE ENCOUNTER
I called and spoke to patient and scheduled patient with FARHAD Kristina on 11/7 at 1:00 with XR at [facility] for 12:00. Patient verbalized understanding and was complaint with date and time of appointment.

## 2024-10-10 ENCOUNTER — TELEPHONE (OUTPATIENT)
Dept: FAMILY MEDICINE | Facility: CLINIC | Age: 64
End: 2024-10-10
Payer: MEDICARE

## 2024-10-10 DIAGNOSIS — J30.2 SEASONAL ALLERGIES: Primary | ICD-10-CM

## 2024-10-10 RX ORDER — CETIRIZINE HYDROCHLORIDE 10 MG/1
10 TABLET ORAL DAILY
Qty: 30 TABLET | Refills: 11 | Status: SHIPPED | OUTPATIENT
Start: 2024-10-10 | End: 2025-10-10

## 2024-10-25 DIAGNOSIS — J30.2 SEASONAL ALLERGIES: Primary | ICD-10-CM

## 2024-10-25 RX ORDER — LEVOCETIRIZINE DIHYDROCHLORIDE 5 MG/1
5 TABLET, FILM COATED ORAL NIGHTLY
Qty: 90 TABLET | Refills: 3 | Status: SHIPPED | OUTPATIENT
Start: 2024-10-25

## 2024-10-26 DIAGNOSIS — M25.562 CHRONIC PAIN OF LEFT KNEE: ICD-10-CM

## 2024-10-26 DIAGNOSIS — F41.9 ANXIETY: ICD-10-CM

## 2024-10-26 DIAGNOSIS — G89.29 CHRONIC PAIN OF LEFT KNEE: ICD-10-CM

## 2024-10-26 DIAGNOSIS — E78.00 HYPERCHOLESTEROLEMIA: ICD-10-CM

## 2024-10-26 DIAGNOSIS — K21.9 GASTROESOPHAGEAL REFLUX DISEASE, UNSPECIFIED WHETHER ESOPHAGITIS PRESENT: ICD-10-CM

## 2024-10-26 DIAGNOSIS — E03.9 HYPOTHYROIDISM, UNSPECIFIED TYPE: ICD-10-CM

## 2024-10-28 RX ORDER — MELOXICAM 7.5 MG/1
TABLET ORAL
Qty: 90 TABLET | Refills: 3 | OUTPATIENT
Start: 2024-10-28

## 2024-10-28 RX ORDER — OMEPRAZOLE 20 MG/1
20 CAPSULE, DELAYED RELEASE ORAL
Qty: 90 CAPSULE | Refills: 3 | Status: SHIPPED | OUTPATIENT
Start: 2024-10-28

## 2024-10-28 RX ORDER — VENLAFAXINE HYDROCHLORIDE 75 MG/1
75 CAPSULE, EXTENDED RELEASE ORAL
Qty: 90 CAPSULE | Refills: 3 | Status: SHIPPED | OUTPATIENT
Start: 2024-10-28

## 2024-10-28 RX ORDER — LEVOTHYROXINE SODIUM 50 UG/1
50 TABLET ORAL
Qty: 90 TABLET | Refills: 3 | Status: SHIPPED | OUTPATIENT
Start: 2024-10-28

## 2024-10-28 RX ORDER — LOVASTATIN 40 MG/1
40 TABLET ORAL NIGHTLY
Qty: 90 TABLET | Refills: 3 | Status: SHIPPED | OUTPATIENT
Start: 2024-10-28

## 2024-11-05 ENCOUNTER — HOSPITAL ENCOUNTER (OUTPATIENT)
Dept: RADIOLOGY | Facility: HOSPITAL | Age: 64
Discharge: HOME | End: 2024-11-05
Attending: NURSE PRACTITIONER
Payer: MEDICARE

## 2024-11-05 DIAGNOSIS — M47.816 LUMBAR SPONDYLOSIS: ICD-10-CM

## 2024-11-05 PROCEDURE — 72120 X-RAY BEND ONLY L-S SPINE: CPT | Mod: TC

## 2024-11-07 ENCOUNTER — TELEPHONE (OUTPATIENT)
Dept: NEUROSURGERY | Facility: CLINIC | Age: 64
End: 2024-11-07

## 2024-11-07 NOTE — TELEPHONE ENCOUNTER
Patient and her  arrived at the office for the appointment that was rescheduled to 11/5 after her Xr at [facility]. (I spoke with her  on 10/30 at 4:05pm and moved the patient's appointments with SHERRIE and the Xr to 11/5 at 10 and 11) Patient had her Xr done on 11/5 at 10am. Unfortunately, we were unable to accommodate the patient today so I scheduled the patient's appointment with SHERRIE for 11/8 at 11am

## 2024-11-07 NOTE — PROGRESS NOTES
[facility]  History & Physical  Neurosurgery      Leanne Hahn   [id number]   1960       SUBJECTIVE:     CHIEF COMPLAINT:  Lower back pain, hip and right leg pain    HPI:  Leanne Hahn is a 64 y.o. female who presents for neurosurgical evaluation at the recommendation of Flor Henderson NP. The patient presents today describing chronic lower back pain, bilateral hip pain radiating into the right leg.  The patient states she will intermittently have shooting, stabbing and cramping pain radiating into the right leg.  She will intermittently have a give-way sensation to the right leg as well and has experienced falls secondary to this.  She describes new onset numbness into the bilateral hands that began 2 days ago.  The patient reports she has been attending physical therapy although stopped this intervention pending this appointment.  The patient rates the pain 4 on the pain scale. The patient states with standing walking and physical activity the symptoms are worse.  She reports despite utilization of gabapentin, diclofenac, Mobic and Tylenol her symptoms have persisted.  The patient denies disturbances in bowel or bladder function.      Past Medical History:   Diagnosis Date    Anemia, unspecified     Arthritis     Chronic pain of left knee     Depression, unspecified depression type     GERD (gastroesophageal reflux disease)     Hypercholesterolemia     Hypothyroidism, unspecified type     Neuropathy     Personal history of colonic polyps     Sciatica        Past Surgical History:   Procedure Laterality Date    CHOLECYSTECTOMY      COLONOSCOPY  02/09/2018    Dixon Jonas/Normal exam    OVARIAN CYST REMOVAL         No family history on file.    Social History     Socioeconomic History    Marital status: Single   Occupational History     Comment: disabled   Tobacco Use    Smoking status: Never    Smokeless tobacco: Never   Substance and Sexual Activity    Alcohol use: Never    Drug use: Never     "Sexual activity: Never        Review of patient's allergies indicates:  No Known Allergies     Current Outpatient Medications   Medication Instructions    acetaminophen (TYLENOL ORAL) Take by mouth.    ferrous sulfate 324 mg, Oral, Daily    folic acid (FOLVITE) 1 mg, Oral, Daily    gabapentin (NEURONTIN) 100 mg, Oral, Every morning    levothyroxine (SYNTHROID) 50 mcg, Oral    lovastatin (MEVACOR) 40 mg, Oral, Nightly    meloxicam (MOBIC) 7.5 MG tablet     omeprazole (PRILOSEC) 20 mg, Oral    venlafaxine (EFFEXOR-XR) 75 mg, Oral          Review of Systems   Constitutional:  Negative for chills, fever and weight loss.   HENT:  Negative for congestion, hearing loss, nosebleeds and tinnitus.    Eyes:  Negative for blurred vision, double vision and photophobia.   Respiratory:  Negative for cough, shortness of breath and wheezing.    Cardiovascular:  Negative for chest pain, palpitations and leg swelling.   Gastrointestinal:  Negative for constipation, diarrhea, nausea and vomiting.   Genitourinary:  Negative for dysuria, frequency and urgency.   Musculoskeletal:  Positive for back pain. Negative for falls and neck pain.   Skin:  Negative for itching and rash.   Neurological:  Positive for tingling (RLE, bilateral hands) and weakness (RLe). Negative for dizziness, tremors, sensory change, speech change, seizures, loss of consciousness and headaches.   Psychiatric/Behavioral:  Negative for depression, hallucinations and memory loss. The patient is not nervous/anxious.        OBJECTIVE:     Visit Vitals  BP (!) 142/82 (BP Location: Left arm, Patient Position: Sitting)   Pulse 86   Resp 16   Ht 5' 2" (1.575 m)   Wt 83.9 kg (185 lb)   LMP  (LMP Unknown)   BMI 33.84 kg/m²        Physical Exam    General:  Pleasant, Well-nourished, Well-groomed.    Cardiovascular:  Neck is supple.    Lungs:  Breathing is quiet, non-lablored    Abdomen:  Soft, non-tender, non-distended.    Neurological:  Muscle strength against resistance:   " Right Left   Deltoid (C5) 5/5 5/5   Biceps (C5/6) 5/5 5/5   Wrist Flexors (C5/6) 5/5 5/5   Triceps (C7) 5/5 5/5   Wrist extension (C7) 5/5 5/5   Finger abduction (C8) 5/5 5/5    5/5 5/5        Hip abduction 5/5 5/5   Hip adduction 5/5 5/5   Hip flexion (L2) 5/5 5/5   Knee extension (L3) 5/5 5/5   Knee flexion (L4) 5/5 5/5   Dorsiflexion (L5) 5/5 5/5   EHL (L5) 5/5 5/5   Plantar flexion (S1) 5/5 5/5   Sensation is intact to primary modalities in bilateral upper and lower extremities, except slightly diminished in the right hand compared to the left to light touch.    Reflexes:   Right Left   Triceps (C7) 2+ 2+   Biceps (C5) 2+ 2+   Brachioradialis (C6) 0 0   Patellar (L4) 2+ 2+   Achilles (S1) 2+ 2+   Negative Clonus, Stuart, and Phalen's bilaterally.  Positive Tinel's: Right  Gait is normal  Coordination is normal.  No tremor noted.    Imaging:  All pertinent neuroimaging independently reviewed. Discussed these findings in detail with the patient.    X-rays of the lumbar spine dated 11/5/24 reveals multilevel degenerative changes with grade 1 anterolisthesis at L4-5 and grade 1-2 anterolisthesis at L5-S1.  Grade 1 retrolisthesis noted at L1-2.  Multilevel disc space narrowing most significant at T12-L1 and L1-2 with no abnormal motion on extension or flexion views.    MRI of the lumbar spine dated 9/25/2024 reveals grade 1 retrolisthesis at T12-L1 as well as L1-2.  Grade 1 anterolisthesis noted at L4-5 and L5-S1.  Hemangiomas noted at T11, T12, L2 and L3.  T12-L1 with disc osteophyte complex greater on the right compressing the thecal sac without cord compression.  L1-2 with disc osteophyte complex compressing the thecal sac with ligamentum flavum thickening and facet hypertrophy resulting in mild canal stenosis and severe left foraminal stenosis.  L2-3 with generalized disc bulge compressing the thecal sac, bilateral facet hypertrophy and ligamentum flavum thickening results in moderate canal stenosis and mild  left foraminal stenosis.  L3-4 with disc osteophyte complex compressing the phrenic goal sac, bilateral facet hypertrophy and ligamentum flavum thickening results in moderate canal stenosis, severe right and moderate left foraminal stenosis.  L4-5 with broad-based disc protrusion compressing the thecal sac with bilateral facet hypertrophy and ligamentum flavum thickening resulting in severe canal stenosis as well as severe bilateral foraminal stenosis.  L5-S1 with disc protrusion, bilateral facet hypertrophy and moderate bilateral foraminal stenosis.    ASSESSMENT:       ICD-10-CM ICD-9-CM   1. Lumbar stenosis with neurogenic claudication  M48.062 724.03   2. Other spondylosis with radiculopathy, lumbar region  M47.26 721.3   3. Bilateral carpal tunnel syndrome  G56.03 354.0       PLAN:     1. Lumbar stenosis with neurogenic claudication (Primary)  - Ambulatory referral/consult to Pain Clinic; Future  - CT Lumbar Spine Without Contrast; Future    2. Other spondylosis with radiculopathy, lumbar region  - Ambulatory referral/consult to Pain Clinic; Future  - CT Lumbar Spine Without Contrast; Future    3. Bilateral carpal tunnel syndrome    Leanne Hahn presents today reporting chronic lower back pain radiating into the bilateral hips as well as into the right lower extremity.  I did take the time to review the patient's x-rays and MRI with her in clinic today.  At this time I am encouraging her to continue on with outpatient physical therapy modalities including core strengthening, traction therapy, dry needling and stretching.  We will also refer the patient on for further consultation with Dr. Young regarding possible epidural steroid injections.  We will have the patient return to clinic with a CT scan of the lumbar spine for further consultation with Dr. Brooks as well.  She was advised to continue on with previously prescribed medications per her primary care provider.  She was advised to notify us with any  further progression of symptoms or prior to this follow-up visit.    Follow up for With Imaging Prior to Appt, with Todd.      E/M Level Based On Time:   15 minutes spent on reviewing chart, which includes interpreting lab results and diagnostic tests.   25 minutes spent in the room with the patient performing a history and physical exam, counseling or educating the patient/caregiver, prescribing medications, ordering labwork/diagnostic tests, or placing referrals.   0 minutes spent collaborating plan of care with physician.   5 minutes spent documenting all relevant clinical informationin the electronic health record.     Total Time Spent: 45 minutes       SALAZAR Barrera    Disclaimer:  This note is prepared using voice recognition software and as such is likely to have errors despite attempts at proofreading. Please contact me for questions.

## 2024-11-08 ENCOUNTER — OFFICE VISIT (OUTPATIENT)
Dept: NEUROSURGERY | Facility: CLINIC | Age: 64
End: 2024-11-08
Payer: MEDICARE

## 2024-11-08 VITALS
SYSTOLIC BLOOD PRESSURE: 142 MMHG | BODY MASS INDEX: 34.04 KG/M2 | HEIGHT: 62 IN | DIASTOLIC BLOOD PRESSURE: 82 MMHG | HEART RATE: 86 BPM | WEIGHT: 185 LBS | RESPIRATION RATE: 16 BRPM

## 2024-11-08 DIAGNOSIS — M47.26 OTHER SPONDYLOSIS WITH RADICULOPATHY, LUMBAR REGION: ICD-10-CM

## 2024-11-08 DIAGNOSIS — M48.062 LUMBAR STENOSIS WITH NEUROGENIC CLAUDICATION: Primary | ICD-10-CM

## 2024-11-08 DIAGNOSIS — G56.03 BILATERAL CARPAL TUNNEL SYNDROME: ICD-10-CM

## 2024-11-18 DIAGNOSIS — D50.8 OTHER IRON DEFICIENCY ANEMIA: ICD-10-CM

## 2024-11-19 RX ORDER — FOLIC ACID 1 MG/1
1000 TABLET ORAL
Qty: 90 TABLET | Refills: 3 | Status: SHIPPED | OUTPATIENT
Start: 2024-11-19

## 2024-11-27 ENCOUNTER — OFFICE VISIT (OUTPATIENT)
Facility: CLINIC | Age: 64
End: 2024-11-27
Payer: MEDICARE

## 2024-11-27 VITALS
TEMPERATURE: 98 F | WEIGHT: 184.94 LBS | BODY MASS INDEX: 34.03 KG/M2 | HEART RATE: 85 BPM | HEIGHT: 62 IN | DIASTOLIC BLOOD PRESSURE: 80 MMHG | SYSTOLIC BLOOD PRESSURE: 109 MMHG

## 2024-11-27 DIAGNOSIS — M48.062 LUMBAR STENOSIS WITH NEUROGENIC CLAUDICATION: Primary | ICD-10-CM

## 2024-11-27 DIAGNOSIS — M47.816 LUMBAR SPONDYLOSIS: ICD-10-CM

## 2024-11-27 DIAGNOSIS — M47.26 OTHER SPONDYLOSIS WITH RADICULOPATHY, LUMBAR REGION: ICD-10-CM

## 2024-11-27 PROCEDURE — 1125F AMNT PAIN NOTED PAIN PRSNT: CPT | Mod: CPTII,,, | Performed by: NURSE PRACTITIONER

## 2024-11-27 PROCEDURE — 3044F HG A1C LEVEL LT 7.0%: CPT | Mod: CPTII,,, | Performed by: NURSE PRACTITIONER

## 2024-11-27 PROCEDURE — 99204 OFFICE O/P NEW MOD 45 MIN: CPT | Mod: ,,, | Performed by: NURSE PRACTITIONER

## 2024-11-27 PROCEDURE — 3079F DIAST BP 80-89 MM HG: CPT | Mod: CPTII,,, | Performed by: NURSE PRACTITIONER

## 2024-11-27 PROCEDURE — 3074F SYST BP LT 130 MM HG: CPT | Mod: CPTII,,, | Performed by: NURSE PRACTITIONER

## 2024-11-27 PROCEDURE — 1160F RVW MEDS BY RX/DR IN RCRD: CPT | Mod: CPTII,,, | Performed by: NURSE PRACTITIONER

## 2024-11-27 PROCEDURE — 3008F BODY MASS INDEX DOCD: CPT | Mod: CPTII,,, | Performed by: NURSE PRACTITIONER

## 2024-11-27 PROCEDURE — 1159F MED LIST DOCD IN RCRD: CPT | Mod: CPTII,,, | Performed by: NURSE PRACTITIONER

## 2024-11-27 PROCEDURE — 3288F FALL RISK ASSESSMENT DOCD: CPT | Mod: CPTII,,, | Performed by: NURSE PRACTITIONER

## 2024-11-27 PROCEDURE — 1101F PT FALLS ASSESS-DOCD LE1/YR: CPT | Mod: CPTII,,, | Performed by: NURSE PRACTITIONER

## 2024-11-27 RX ORDER — CETIRIZINE HYDROCHLORIDE 10 MG/1
10 TABLET ORAL
COMMUNITY
Start: 2024-11-18

## 2024-11-27 NOTE — PROGRESS NOTES
[facility]    Subjective:     Chief Complaint: Back Pain (Referred by Kristina Hernandez NP, no prior injury or sx, has not started P.T., tylenol for relief, pain level 10/10)    Referred by: Kristina Hernandez FNP     History of Present Illness: Leanne Hahn is a 64 y.o. female who presents for an interventional pain valuation at the recommendation of neurosurgical FNP, Mary, for lumbar stenosis with neurogenic claudication, with a request for possible epidural steroid injections.  Patient's pain score today as a 10/10.  The patient presents today describing chronic lower back pain, bilateral hip pain radiating into the right lateral upper thigh and  leg.  The patient states she will intermittently have shooting, stabbing and cramping pain radiating into the right leg.  She will intermittently have a give-way sensation to the right leg as well and has experienced falls secondary to this.  There pertinent findings at her MRI lumbar spine L4-L5 shows moderate to severe spinal canal stenosis.  There is no evidence of notable foraminal stenosis throughout her lumbar spine.  She does not have a bilateral nerve conduction study or EMG bilateral lower extremities.  She describes new onset numbness into the bilateral hands that began a week ago.  The patient reports she has been attending physical therapy for almost a week and reported her pain was intense after physical therapy thus she has not scheduled an appointment to go back.  Her pain will elevate to a 10/10 with yd work, bending down she avoids steps or climbing a ladder due to her leg weakness and fear of falling and pain.  Her pain will reduce by intermittently lying down and sitting down in intervals.  She also has to do household chores in intervals as well.  She also reports bilateral foot numbness with no history of diabetes.  She denies peripheral neuropathy.  She also reports her back pain is equally as problematic as her right sciatica  "in her leg.  MRI lumbar spine shows diffuse lumbar facet arthropathy throughout most every level.  She does not have a pacemaker, cord stimulator or a defibrillator.    The patient rates the pain 10/10 on the pain scale.  Today The patient states with standing walking and physical activity the symptoms are worse.  These activities will elevate her pain score to a 10/10.  She reports despite utilization of gabapentin 100 mg daily, Mobic 7.5 mg daily, diclofenac,  and Tylenol her symptoms have persisted.  The patient denies disturbances in bowel or bladder function.      Pertinent PMH: Chronic left knee pain, depression, GERD, elevated cholesterol, hypothyroidism, neuropathy, arthritis, sciatica,  Pertinent PSH:  No spinal surgeries, pacemaker, defibrillator or spinal cord stimulator    Visit Vitals  /80 (BP Location: Left arm, Patient Position: Sitting)   Pulse 85   Temp 98.3 °F (36.8 °C) (Oral)   Ht 5' 2" (1.575 m)   Wt 83.9 kg (184 lb 15.5 oz)   LMP  (LMP Unknown)   BMI 33.83 kg/m²      Vitals:    11/27/24 1304   PainSc: 10-Worst pain ever     Pain Disability Index (PDI): 35       Interventional Pain History  None    ROS: Low back, hip and right leg pain    FINDINGS:  For the purpose of this report, the most inferior well developed intervertebral disc space is presumed to represent L5-S1.  There is grade 1 retrolisthesis of T12 on L1, L1 on L2, grade 1 anterolisthesis of L4 on L5 and also grade 1 anterolisthesis of L5 on S1.  Hemangiomas involve T9, T10, L2 and L4 vertebral bodies.  Lumbar discs are desiccated throughout.  There are multilevel degenerate signals of the opposing endplates which are most apparent at the level of L1-L2 and also show mild reactive marrow edematous signal.  There are no apparent paraspinal soft inflammations. The visualized thoracic cord is unremarkable. The conus medullaris terminates at T12-L1.  Disc segmental analysis is given below:     At T12-L1, there is osteophyte disc " complex which is more pronounced in the right paracentral location and compresses the ventral thecal sac without cord compression.  Bilateral neural foramen are not imaged on the exam.     At L1-L2, there is osteophyte disc complex which compresses and flattens the ventral thecal sac.  There is also ligamentum flavum thickening and facet arthropathy.  These findings combine to cause mild central canal stenosis.  Right neural foramen is patent.  Marked narrowing of left neural foramen is caused by uncovertebral and facet arthropathy.     At L2-L3, there is generalized disc bulge which compresses the ventral thecal sac.  Bilateral facets arthropathy and ligamentum flavum thickening.  These findings combine to cause mild to moderate central canal stenosis.  Right neural foramen is patent.  There is mild spondylotic narrowing of the left neural foramen.     At L3-L4, there is osteophyte disc complex which compresses the ventral thecal sac.  Bilateral facet arthropathy and ligamentum flavum.  In conjunction, these findings result in moderate central canal stenosis.  There is marked spondylotic narrowing of the right neural foramen and moderate narrowing of the left neural foramen.     At L4-L5, there is broad disc protrusion which compresses the ventral thecal sac.  Bilateral facet arthropathy and ligamentum flavum thickening.  These findings result in moderate to marked central canal stenosis.  There are bilateral marked narrowings of the neural foramen.     At L5-S1, there is also disc protrusion which indents the ventral thecal sac.  Bilateral facet arthropathy.  There is no significant central canal stenosis.  There are bilateral moderate spondylotic narrowings of the neural foramen.     Impression:     Lumbar degenerative disc disease and spondylosis level by level discussed above.        Electronically signed by:Willian Mejia  Date:                                            09/25/2024  Time:                                Objective:        Physical Exam  General: Well developed; overweight; A&O x 3; No anxiety/depression; NAD  Mental Status: Oriented to person, palce and time. Displays appropriate mood & affect.  Head: Norm cephalic and atraumatic  Neck:  No cervical paraspinal banding.  Full range of motion with lateral turning and cervical flexion +extension.  Eyes: normal conjunctiva, normal lids, normal pupils  ENT and mouth: normal external ear, nose, and no lesions noted on the lips.  Respiratory: Symmetrical, Unlabored. No dyspnea  CV: normal rhythm and rate. No peripheral edema.   Abdomen: Non-distended    Extremities:  Gen: No cyanosis or tenderness to palpation bilateral upper and lower extremities  Skin: Warm, pink, dry, no rashes, no lesions on the lumbar spine  Strength: 5/5 motor strength bilateral upper and lower extremities  ROM: Full ROM in bilateral knees and ankles without pain or instability.    Neuro:  Gait: no altalgic lean, normal toe and heel raise. Independent ambulator.  DTR's: 2+ in bilateral patellar,   Sensory: Intact to light touch bilateral  upper and lower extremities    Spine: Normal lordosis. No scoliosis  L-spine ROM:  Limited and pain ROM to flexion, extension, bilateral rotation,   Straight Leg Raise:  Positive right,   SI Joint: No tenderness to palpation bilaterally.      Assessment:     Leanne Hahn is a 64 y.o. female who presents for an interventional pain valuation at the recommendation of neurosurgical FNP, Mary, for lumbar stenosis with neurogenic claudication, with a request for possible epidural steroid injections.  Patient's pain score today as a 10/10.  The patient presents today describing chronic lower back pain, bilateral hip pain radiating into the right lateral upper thigh and  leg.  The patient states she will intermittently have shooting, stabbing and cramping pain radiating into the right leg.  She will intermittently have a give-way sensation to the right leg as  "well and has experienced falls secondary to this.  There pertinent findings at her MRI lumbar spine L4-L5 shows moderate to severe spinal canal stenosis.  There is no evidence of notable foraminal stenosis throughout her lumbar spine.  She does not have a bilateral nerve conduction study or EMG bilateral lower extremities.  She describes new onset numbness into the bilateral hands that began a week ago.  The patient reports she has been attending physical therapy for almost a week and reported her pain was intense after physical therapy thus she has not scheduled an appointment to go back.  Her pain will elevate to a 10/10 with yd work, bending down she avoids steps or climbing a ladder due to her leg weakness and fear of falling and pain.  Her pain will reduce by intermittently lying down and sitting down in intervals.  She also has to do household chores in intervals as well.  She also reports bilateral foot numbness with no history of diabetes.  She denies peripheral neuropathy.  She also reports her back pain is equally as problematic as her right sciatica in her leg.  MRI lumbar spine shows diffuse lumbar facet arthropathy throughout most every level.  She does not have a pacemaker, cord stimulator or a defibrillator.    Plan of care:   Bilateral L4 TFESI  Hold Mobic 7 days prior to procedure  Future considerations include diagnostic medial branch blocks with a RFA to her most symptomatic lumbar facet joints that have more arthritis.  Encounter Diagnoses   Name Primary?    Lumbar stenosis with neurogenic claudication Yes    Lumbar spondylosis     Other spondylosis with radiculopathy, lumbar region          Plan:       Leanne "Isabela" was seen today for back pain.    Diagnoses and all orders for this visit:    Lumbar stenosis with neurogenic claudication  -     Ambulatory referral/consult to Pain Clinic    Lumbar spondylosis    Other spondylosis with radiculopathy, lumbar region  -     Ambulatory referral/consult " to Pain Clinic           Past Medical History:   Diagnosis Date    Anemia, unspecified     Arthritis     Chronic pain of left knee     Depression, unspecified depression type     GERD (gastroesophageal reflux disease)     Hypercholesterolemia     Hypothyroidism, unspecified type     Neuropathy     Personal history of colonic polyps     Sciatica        Past Surgical History:   Procedure Laterality Date    CHOLECYSTECTOMY      COLONOSCOPY  02/09/2018    Dixon Sumi/Normal exam    OVARIAN CYST REMOVAL         No family history on file.    Social History     Socioeconomic History    Marital status: Single   Occupational History     Comment: disabled   Tobacco Use    Smoking status: Never    Smokeless tobacco: Never   Substance and Sexual Activity    Alcohol use: Never    Drug use: Never    Sexual activity: Never       Current Outpatient Medications   Medication Sig Dispense Refill    acetaminophen (TYLENOL ORAL) Take by mouth.      cetirizine (ZYRTEC) 10 MG tablet Take 10 mg by mouth.      ferrous sulfate 324 mg (65 mg iron) TbEC Take 1 tablet (324 mg total) by mouth once daily. 90 tablet 3    folic acid (FOLVITE) 1 MG tablet TAKE 1 TABLET ONE TIME DAILY 90 tablet 3    gabapentin (NEURONTIN) 100 MG capsule Take 1 capsule (100 mg total) by mouth every morning. 30 capsule 11    levothyroxine (SYNTHROID) 50 MCG tablet TAKE 1 TABLET ONE TIME DAILY 90 tablet 3    lovastatin (MEVACOR) 40 MG tablet TAKE 1 TABLET EVERY NIGHT 90 tablet 3    meloxicam (MOBIC) 7.5 MG tablet       omeprazole (PRILOSEC) 20 MG capsule TAKE 1 CAPSULE ONE TIME DAILY 90 capsule 3    venlafaxine (EFFEXOR-XR) 75 MG 24 hr capsule TAKE 1 CAPSULE ONE TIME DAILY 90 capsule 3     No current facility-administered medications for this visit.       Review of patient's allergies indicates:  No Known Allergies

## 2024-11-29 DIAGNOSIS — D64.9 ANEMIA, UNSPECIFIED TYPE: ICD-10-CM

## 2024-12-02 ENCOUNTER — TELEPHONE (OUTPATIENT)
Dept: FAMILY MEDICINE | Facility: CLINIC | Age: 64
End: 2024-12-02
Payer: MEDICARE

## 2024-12-02 DIAGNOSIS — D50.8 OTHER IRON DEFICIENCY ANEMIA: ICD-10-CM

## 2024-12-02 RX ORDER — FERROUS SULFATE 324(65)MG
TABLET, DELAYED RELEASE (ENTERIC COATED) ORAL
Qty: 90 TABLET | Refills: 3 | Status: SHIPPED | OUTPATIENT
Start: 2024-12-02

## 2024-12-02 RX ORDER — FOLIC ACID 1 MG/1
1000 TABLET ORAL DAILY
Qty: 90 TABLET | Refills: 3 | Status: SHIPPED | OUTPATIENT
Start: 2024-12-02

## 2024-12-10 ENCOUNTER — TELEPHONE (OUTPATIENT)
Dept: NEUROSURGERY | Facility: CLINIC | Age: 64
End: 2024-12-10
Payer: MEDICARE

## 2024-12-10 NOTE — TELEPHONE ENCOUNTER
I called and spoke to patient in regards to time change for patient's appointment with  on 2/5/25. I advised patient that the time changed from 3:20 to 3:00. I also reminded patient of CT Spine scan on 1/28/25 at [facility] at 11:00. Patient verbalized understanding and was complaint with dates and times of appointment.

## 2024-12-18 ENCOUNTER — OFFICE VISIT (OUTPATIENT)
Facility: CLINIC | Age: 64
End: 2024-12-18
Payer: MEDICARE

## 2024-12-18 VITALS
HEIGHT: 62 IN | HEART RATE: 87 BPM | WEIGHT: 184 LBS | SYSTOLIC BLOOD PRESSURE: 131 MMHG | BODY MASS INDEX: 33.86 KG/M2 | TEMPERATURE: 98 F | DIASTOLIC BLOOD PRESSURE: 71 MMHG

## 2024-12-18 DIAGNOSIS — M48.062 LUMBAR STENOSIS WITH NEUROGENIC CLAUDICATION: Primary | ICD-10-CM

## 2024-12-18 DIAGNOSIS — M47.816 LUMBAR SPONDYLOSIS: ICD-10-CM

## 2024-12-18 PROCEDURE — 3008F BODY MASS INDEX DOCD: CPT | Mod: CPTII,,, | Performed by: NURSE PRACTITIONER

## 2024-12-18 PROCEDURE — 1160F RVW MEDS BY RX/DR IN RCRD: CPT | Mod: CPTII,,, | Performed by: NURSE PRACTITIONER

## 2024-12-18 PROCEDURE — 99213 OFFICE O/P EST LOW 20 MIN: CPT | Mod: ,,, | Performed by: NURSE PRACTITIONER

## 2024-12-18 PROCEDURE — 3078F DIAST BP <80 MM HG: CPT | Mod: CPTII,,, | Performed by: NURSE PRACTITIONER

## 2024-12-18 PROCEDURE — 3288F FALL RISK ASSESSMENT DOCD: CPT | Mod: CPTII,,, | Performed by: NURSE PRACTITIONER

## 2024-12-18 PROCEDURE — 3075F SYST BP GE 130 - 139MM HG: CPT | Mod: CPTII,,, | Performed by: NURSE PRACTITIONER

## 2024-12-18 PROCEDURE — 1125F AMNT PAIN NOTED PAIN PRSNT: CPT | Mod: CPTII,,, | Performed by: NURSE PRACTITIONER

## 2024-12-18 PROCEDURE — 1159F MED LIST DOCD IN RCRD: CPT | Mod: CPTII,,, | Performed by: NURSE PRACTITIONER

## 2024-12-18 PROCEDURE — 1101F PT FALLS ASSESS-DOCD LE1/YR: CPT | Mod: CPTII,,, | Performed by: NURSE PRACTITIONER

## 2024-12-18 PROCEDURE — 3044F HG A1C LEVEL LT 7.0%: CPT | Mod: CPTII,,, | Performed by: NURSE PRACTITIONER

## 2024-12-18 RX ORDER — MELATONIN 10 MG
1 TABLET, SUBLINGUAL SUBLINGUAL 2 TIMES DAILY WITH MEALS
COMMUNITY

## 2024-12-18 RX ORDER — VITAMIN B COMPLEX
1 CAPSULE ORAL DAILY
COMMUNITY

## 2024-12-18 RX ORDER — LEVOCETIRIZINE DIHYDROCHLORIDE 5 MG/1
5 TABLET, FILM COATED ORAL NIGHTLY
COMMUNITY

## 2024-12-18 NOTE — H&P (VIEW-ONLY)
Pain Management Clinic  Pre-Operative Clinic Note      SUBJECTIVE    CHIEF COMPLAINT: Pre-op Exam (Pre op procedure 1/2/25 C/O pain level 8, not taking pain meds)       History of Present Illness: 64 y.o. female presents today for preoperative evaluation for bilateral transforaminal epidural steroid injection L4 on 0 1/02/2025. I reviewed the indications for procedure. The risks and benefits of the proposed and alternative treatments were discussed with the patient. Questions pertinent to the procedure were solicited and answered. No assurances were given. Informed consent was obtained. The patient expressed good understanding and wished to proceed with scheduling the procedure.     Review of Systems:   Constitutional: No fever, weakness, or fatigue.   Ear/Nose/Mouth/Throat: No nasal congestion or sore throat.   Respiratory: No shortness of breath or cough.   Cardiovascular: No chest pain, palpitations, or peripheral edema.   Gastrointestinal: No nausea, vomiting, or abdominal pain.   Genitourinary: No dysuria.  Musculoskeletal: Leanne Hahn is a 64 y.o. female who presents for an interventional pain valuation at the recommendation of neurosurgical FNPMary, for lumbar stenosis with neurogenic claudication, with a request for possible epidural steroid injections.  Patient's pain score today as a 10/10.  The patient presents today describing chronic lower back pain, bilateral hip pain radiating into the right lateral upper thigh and  leg.  The patient states she will intermittently have shooting, stabbing and cramping pain radiating into the right leg.  She will intermittently have a give-way sensation to the right leg as well and has experienced falls secondary to this.  There pertinent findings at her MRI lumbar spine L4-L5 shows moderate to severe spinal canal stenosis.  There is no evidence of notable foraminal stenosis throughout her lumbar spine.  She does not have a bilateral nerve conduction study  "or EMG bilateral lower extremities.  She describes new onset numbness into the bilateral hands that began a week ago.  The patient reports she has been attending physical therapy for almost a week and reported her pain was intense after physical therapy thus she has not scheduled an appointment to go back.  Her pain will elevate to a 10/10 with yd work, bending down she avoids steps or climbing a ladder due to her leg weakness and fear of falling and pain.  Her pain will reduce by intermittently lying down and sitting down in intervals.  She also has to do household chores in intervals as well.  She also reports bilateral foot numbness with no history of diabetes.  She denies peripheral neuropathy.  She also reports her back pain is equally as problematic as her right sciatica in her leg.  MRI lumbar spine shows diffuse lumbar facet arthropathy throughout most every level.  She does not have a pacemaker, cord stimulator or a defibrillator.     The patient rates the pain 10/10 on the pain scale.  Today The patient states with standing walking and physical activity the symptoms are worse.  These activities will elevate her pain score to a 10/10.  She reports despite utilization of gabapentin 100 mg daily, Mobic 7.5 mg daily, diclofenac,  and Tylenol her symptoms have persisted.  The patient     Past Surgical History:   Procedure Laterality Date    CHOLECYSTECTOMY      COLONOSCOPY  02/09/2018    Dixon Sumi/Normal exam    OVARIAN CYST REMOVAL          Past Medical History:   Diagnosis Date    Anemia, unspecified     Arthritis     Chronic pain of left knee     Depression, unspecified depression type     GERD (gastroesophageal reflux disease)     Hypercholesterolemia     Hypothyroidism, unspecified type     Neuropathy     Personal history of colonic polyps     Sciatica         OBJECTIVE:    Visit Vitals  /71 (BP Location: Right arm, Patient Position: Sitting)   Pulse 87   Temp 97.9 °F (36.6 °C)   Ht 5' 2" (1.575 m) "   Wt 83.5 kg (184 lb)   LMP  (LMP Unknown)   BMI 33.65 kg/m²     Vitals:    12/18/24 1324   PainSc:   8       Physical Exam:   General: Well-developed, well-nourished.  Neuro: Alert and oriented x 3.  Psych: Normal mood and affect.  HEENT: Normocephalic. PERRLA EOM intact. Nose and throat clear.  Lungs: Clear to auscultation and percussion.  Heart: Regular rate and rhythm   Abdomen: Soft non-tender. Bowel sounds positive. No rebound tenderness.  Skin: No rashes or open wounds  Musculoskeletal:Extremities:  Gen: No cyanosis or tenderness to palpation bilateral upper and lower extremities  Skin: Warm, pink, dry, no rashes, no lesions on the lumbar spine  Strength: 5/5 motor strength bilateral upper and lower extremities  ROM: Full ROM in bilateral knees and ankles without pain or instability.     Neuro:  Gait: no altalgic lean, normal toe and heel raise. Independent ambulator.  DTR's: 2+ in bilateral patellar,   Sensory: Intact to light touch bilateral  upper and lower extremities     Spine: Normal lordosis. No scoliosis  L-spine ROM:  Limited and pain ROM to flexion, extension, bilateral rotation,   Straight Leg Raise:  Positive right,   SI Joint: No tenderness to palpation bilaterally.    ASSESSMENT:  1. Lumbar stenosis with neurogenic claudication    2. Lumbar spondylosis       PLAN:  Plan for to proceed with  bilateral transforaminal epidural steroid injection L4 on 0 1/02/2025.   [x] The patient has been given preoperative instructions and prescriptions for post-operative medication.   [x] Medications to hold:  Mobic (meloxicam) 7 days prior to procedure and resume postop  [] Cardiac clearance received and reviewed.  [x] Post-operative appointment is scheduled for 2 weeks.  Additionally, patient has a follow-up appointment with neurosurgeon Dr. Brooks on 02/05/2025    Addendum:  I ordered physical therapy for her neck and arm pain.  Will discuss during her follow-up postop appointment.

## 2024-12-31 ENCOUNTER — TELEPHONE (OUTPATIENT)
Dept: FAMILY MEDICINE | Facility: CLINIC | Age: 64
End: 2024-12-31
Payer: MEDICARE

## 2025-01-01 ENCOUNTER — ANESTHESIA EVENT (OUTPATIENT)
Facility: HOSPITAL | Age: 65
End: 2025-01-01
Payer: MEDICARE

## 2025-01-02 ENCOUNTER — ANESTHESIA (OUTPATIENT)
Facility: HOSPITAL | Age: 65
End: 2025-01-02
Payer: MEDICARE

## 2025-01-02 ENCOUNTER — HOSPITAL ENCOUNTER (OUTPATIENT)
Facility: HOSPITAL | Age: 65
Discharge: HOME | End: 2025-01-02
Attending: ANESTHESIOLOGY | Admitting: ANESTHESIOLOGY
Payer: MEDICARE

## 2025-01-02 VITALS
DIASTOLIC BLOOD PRESSURE: 74 MMHG | HEIGHT: 62 IN | WEIGHT: 173 LBS | BODY MASS INDEX: 31.83 KG/M2 | TEMPERATURE: 97 F | HEART RATE: 82 BPM | RESPIRATION RATE: 16 BRPM | OXYGEN SATURATION: 98 % | SYSTOLIC BLOOD PRESSURE: 138 MMHG

## 2025-01-02 DIAGNOSIS — G89.29 CHRONIC BACK PAIN GREATER THAN 3 MONTHS DURATION: Primary | ICD-10-CM

## 2025-01-02 DIAGNOSIS — M54.9 BACK PAIN: ICD-10-CM

## 2025-01-02 DIAGNOSIS — M54.9 CHRONIC BACK PAIN GREATER THAN 3 MONTHS DURATION: Primary | ICD-10-CM

## 2025-01-02 PROCEDURE — 63600175 PHARM REV CODE 636 W HCPCS: Performed by: ANESTHESIOLOGY

## 2025-01-02 PROCEDURE — 64483 NJX AA&/STRD TFRM EPI L/S 1: CPT | Mod: 50 | Performed by: ANESTHESIOLOGY

## 2025-01-02 PROCEDURE — 64483 NJX AA&/STRD TFRM EPI L/S 1: CPT | Mod: 50,,, | Performed by: ANESTHESIOLOGY

## 2025-01-02 PROCEDURE — 37000008 HC ANESTHESIA 1ST 15 MINUTES: Performed by: ANESTHESIOLOGY

## 2025-01-02 PROCEDURE — 63600175 PHARM REV CODE 636 W HCPCS: Performed by: NURSE ANESTHETIST, CERTIFIED REGISTERED

## 2025-01-02 RX ORDER — GLUCAGON 1 MG
1 KIT INJECTION
Status: DISCONTINUED | OUTPATIENT
Start: 2025-01-02 | End: 2025-01-02 | Stop reason: HOSPADM

## 2025-01-02 RX ORDER — PROPOFOL 10 MG/ML
VIAL (ML) INTRAVENOUS
Status: DISCONTINUED | OUTPATIENT
Start: 2025-01-02 | End: 2025-01-02

## 2025-01-02 RX ORDER — LIDOCAINE HYDROCHLORIDE 10 MG/ML
1 INJECTION, SOLUTION EPIDURAL; INFILTRATION; INTRACAUDAL; PERINEURAL ONCE
Status: DISCONTINUED | OUTPATIENT
Start: 2025-01-02 | End: 2025-01-02 | Stop reason: HOSPADM

## 2025-01-02 RX ORDER — DEXAMETHASONE SODIUM PHOSPHATE 10 MG/ML
INJECTION INTRAMUSCULAR; INTRAVENOUS
Status: DISCONTINUED | OUTPATIENT
Start: 2025-01-02 | End: 2025-01-02 | Stop reason: HOSPADM

## 2025-01-02 RX ORDER — LIDOCAINE HYDROCHLORIDE 10 MG/ML
INJECTION, SOLUTION EPIDURAL; INFILTRATION; INTRACAUDAL; PERINEURAL
Status: DISCONTINUED | OUTPATIENT
Start: 2025-01-02 | End: 2025-01-02

## 2025-01-02 RX ORDER — ONDANSETRON HYDROCHLORIDE 2 MG/ML
4 INJECTION, SOLUTION INTRAVENOUS ONCE AS NEEDED
Status: DISCONTINUED | OUTPATIENT
Start: 2025-01-02 | End: 2025-01-02 | Stop reason: HOSPADM

## 2025-01-02 RX ORDER — BUPIVACAINE HYDROCHLORIDE 2.5 MG/ML
INJECTION, SOLUTION EPIDURAL; INFILTRATION; INTRACAUDAL
Status: DISCONTINUED | OUTPATIENT
Start: 2025-01-02 | End: 2025-01-02 | Stop reason: HOSPADM

## 2025-01-02 RX ORDER — LIDOCAINE HYDROCHLORIDE 10 MG/ML
INJECTION, SOLUTION EPIDURAL; INFILTRATION; INTRACAUDAL; PERINEURAL
Status: DISCONTINUED | OUTPATIENT
Start: 2025-01-02 | End: 2025-01-02 | Stop reason: HOSPADM

## 2025-01-02 RX ADMIN — PROPOFOL 20 MG: 10 INJECTION, EMULSION INTRAVENOUS at 09:01

## 2025-01-02 RX ADMIN — PROPOFOL 50 MG: 10 INJECTION, EMULSION INTRAVENOUS at 09:01

## 2025-01-02 RX ADMIN — LIDOCAINE HYDROCHLORIDE 30 MG: 10 INJECTION, SOLUTION EPIDURAL; INFILTRATION; INTRACAUDAL; PERINEURAL at 09:01

## 2025-01-02 NOTE — ANESTHESIA PREPROCEDURE EVALUATION
01/01/2025  Leanne Hahn is a 64 y.o., female, who presents for the following:    Procedure: INJECTION, STEROID, EPIDURAL, TRANSFORAMINAL APPROACH    /////Bilateral TFESI L4 (Bilateral: Back) - Bilateral TFESI L4   Anesthesia type: Local MAC   Diagnosis:      Lumbar stenosis with neurogenic claudication [M48.062]      Lumbar spondylosis [M47.816]      Other spondylosis with radiculopathy, lumbar region [M47.26]   Pre-op diagnosis:      Lumbar stenosis with neurogenic claudication [M48.062]      Lumbar spondylosis [M47.816]      Other spondylosis with radiculopathy, lumbar region [M47.26]   Location: [address] VIRTUAL PROCEDURAL ROOM / [address] OR   Surgeons: Noelle Young MD     Past Medical History:   Diagnosis Date    Anemia, unspecified     Arthritis     Chronic pain of left knee     Depression, unspecified depression type     GERD (gastroesophageal reflux disease)     Hypercholesterolemia     Hypothyroidism, unspecified type     Neuropathy     Personal history of colonic polyps     Sciatica          Pre-op Assessment    I have reviewed the Patient Summary Reports.     I have reviewed the Nursing Notes. I have reviewed the NPO Status.   I have reviewed the Medications.     Review of Systems  Anesthesia Hx:  No problems with previous Anesthesia             Denies Family Hx of Anesthesia complications.    Denies Personal Hx of Anesthesia complications.                    Social:  Non-Smoker       Cardiovascular:  Cardiovascular Normal                                              Pulmonary:  Pulmonary Normal                       Hepatic/GI:     GERD                Endocrine:   Hypothyroidism          Psych:    depression                Physical Exam  General: Alert and Oriented    Airway:  Mallampati: II   Mouth Opening: Normal  TM Distance: Normal  Tongue: Normal  Neck ROM: Normal  ROM    Dental:  Dentures    Chest/Lungs:  Normal Respiratory Rate    Heart:  Rate: Normal  Rhythm: Regular Rhythm        Anesthesia Plan  Type of Anesthesia, risks & benefits discussed:    Anesthesia Type: Gen Natural Airway  Intra-op Monitoring Plan: Standard ASA Monitors  Post Op Pain Control Plan: IV/PO Opioids PRN  Induction:  IV  Airway Plan: Direct  Informed Consent: Informed consent signed with the Patient and all parties understand the risks and agree with anesthesia plan.  All questions answered. Patient consented to blood products? No  ASA Score: 2  Day of Surgery Review of History & Physical: H&P Update referred to the surgeon/provider.  Anesthesia Plan Notes: Nasal cannula vs facemask supplemental oxygenation   For patients with BIANCA/obesity, may consider SuperNoval Nasal CPAP      Ready For Surgery From Anesthesia Perspective.     .

## 2025-01-02 NOTE — ANESTHESIA POSTPROCEDURE EVALUATION
Anesthesia Post Evaluation    Patient: Leanne Hahn    Procedure(s) Performed: Procedure(s) (LRB):  INJECTION, STEROID, EPIDURAL, TRANSFORAMINAL APPROACH    /////Bilateral TFESI L4 (Bilateral)    Final Anesthesia Type: general      Patient location during evaluation: OPS  Patient participation: Yes- Able to Participate  Level of consciousness: awake and alert  Post-procedure vital signs: reviewed and stable  Pain management: adequate  Airway patency: patent    PONV status at discharge: No PONV  Anesthetic complications: no      Cardiovascular status: blood pressure returned to baseline  Respiratory status: unassisted and room air  Hydration status: euvolemic  Follow-up not needed.              Vitals Value Taken Time   /85 01/02/25 0933   Temp 36.3 °C (97.3 °F) 01/02/25 0933   Pulse 81 01/02/25 0933   Resp 15 01/02/25 0933   SpO2 99 % 01/02/25 0933         No case tracking events are documented in the log.      Pain/Abi Score: No data recorded

## 2025-01-02 NOTE — DISCHARGE SUMMARY
[facility] Surgical - Periop Services 2nd Floor  Discharge Note  Short Stay    Procedure(s) (LRB):  INJECTION, STEROID, EPIDURAL, TRANSFORAMINAL APPROACH    /////Bilateral TFESI L4 (Bilateral)      OUTCOME: Patient tolerated treatment/procedure well without complication and is now ready for discharge.    DISPOSITION: Home or Self Care    FINAL DIAGNOSIS:  <principal problem not specified>    FOLLOWUP: In clinic    DISCHARGE INSTRUCTIONS:  No discharge procedures on file.     TIME SPENT ON DISCHARGE: 5 minutes

## 2025-01-02 NOTE — TRANSFER OF CARE
"Anesthesia Transfer of Care Note    Patient: Leanne Hahn    Procedure(s) Performed: Procedure(s) (LRB):  INJECTION, STEROID, EPIDURAL, TRANSFORAMINAL APPROACH    /////Bilateral TFESI L4 (Bilateral)    Patient location: OPS    Anesthesia Type: general    Transport from OR: Transported from OR on room air with adequate spontaneous ventilation    Post pain: adequate analgesia    Post assessment: no apparent anesthetic complications    Post vital signs: stable    Level of consciousness: awake    Nausea/Vomiting: no nausea/vomiting    Complications: none    Transfer of care protocol was followed      Last vitals: Visit Vitals  /85   Pulse 81   Temp 36.3 °C (97.3 °F)   Resp 15   Ht 5' 2" (1.575 m)   Wt 78.5 kg (173 lb)   LMP  (LMP Unknown)   SpO2 99%   Breastfeeding No   BMI 31.64 kg/m²     "

## 2025-01-07 ENCOUNTER — OFFICE VISIT (OUTPATIENT)
Dept: FAMILY MEDICINE | Facility: CLINIC | Age: 65
End: 2025-01-07
Payer: MEDICARE

## 2025-01-07 VITALS
WEIGHT: 187.81 LBS | TEMPERATURE: 98 F | SYSTOLIC BLOOD PRESSURE: 120 MMHG | HEART RATE: 79 BPM | HEIGHT: 62 IN | DIASTOLIC BLOOD PRESSURE: 79 MMHG | RESPIRATION RATE: 18 BRPM | BODY MASS INDEX: 34.56 KG/M2 | OXYGEN SATURATION: 99 %

## 2025-01-07 DIAGNOSIS — F32.A DEPRESSION, UNSPECIFIED DEPRESSION TYPE: Primary | ICD-10-CM

## 2025-01-07 DIAGNOSIS — E66.9 OBESITY, UNSPECIFIED CLASS, UNSPECIFIED OBESITY TYPE, UNSPECIFIED WHETHER SERIOUS COMORBIDITY PRESENT: ICD-10-CM

## 2025-01-07 PROCEDURE — 3288F FALL RISK ASSESSMENT DOCD: CPT | Mod: ,,, | Performed by: NURSE PRACTITIONER

## 2025-01-07 PROCEDURE — 3078F DIAST BP <80 MM HG: CPT | Mod: ,,, | Performed by: NURSE PRACTITIONER

## 2025-01-07 PROCEDURE — 1160F RVW MEDS BY RX/DR IN RCRD: CPT | Mod: ,,, | Performed by: NURSE PRACTITIONER

## 2025-01-07 PROCEDURE — 1159F MED LIST DOCD IN RCRD: CPT | Mod: ,,, | Performed by: NURSE PRACTITIONER

## 2025-01-07 PROCEDURE — 99214 OFFICE O/P EST MOD 30 MIN: CPT | Mod: ,,, | Performed by: NURSE PRACTITIONER

## 2025-01-07 PROCEDURE — G2211 COMPLEX E/M VISIT ADD ON: HCPCS | Mod: ,,, | Performed by: NURSE PRACTITIONER

## 2025-01-07 PROCEDURE — 1126F AMNT PAIN NOTED NONE PRSNT: CPT | Mod: ,,, | Performed by: NURSE PRACTITIONER

## 2025-01-07 PROCEDURE — 3008F BODY MASS INDEX DOCD: CPT | Mod: ,,, | Performed by: NURSE PRACTITIONER

## 2025-01-07 PROCEDURE — 3074F SYST BP LT 130 MM HG: CPT | Mod: ,,, | Performed by: NURSE PRACTITIONER

## 2025-01-07 PROCEDURE — 1101F PT FALLS ASSESS-DOCD LE1/YR: CPT | Mod: ,,, | Performed by: NURSE PRACTITIONER

## 2025-01-07 RX ORDER — CHOLECALCIFEROL (VITAMIN D3) 50 MCG
2000 TABLET ORAL DAILY
COMMUNITY

## 2025-01-07 NOTE — PROGRESS NOTES
Patient ID: [id number]     Chief Complaint: 6 month FU      HPI:     Leanne Hahn is a 64 y.o. female here today for a follow up. No other complaints today.       Past Medical History:  has a past medical history of Anemia, unspecified, Arthritis, Chronic pain of left knee, Depression, unspecified depression type, GERD (gastroesophageal reflux disease), Hypercholesterolemia, Hypothyroidism, unspecified type, Neuropathy, Personal history of colonic polyps, and Sciatica.    Surgical History:  has a past surgical history that includes Ovarian cyst removal; Cholecystectomy; Colonoscopy (02/09/2018); and Transforaminal epidural injection of steroid (Bilateral, 1/2/2025).    Family History: family history includes Breast cancer in her maternal aunt.    Social History:  reports that she has never smoked. She has never used smokeless tobacco. She reports that she does not drink alcohol and does not use drugs.    Current Outpatient Medications   Medication Instructions    acetaminophen (TYLENOL ORAL) Take by mouth.    b complex vitamins capsule 1 capsule, Daily    cholecalciferol (vitamin D3) (VITAMIN D3) 2,000 Units, Daily    ferrous sulfate 324 mg (65 mg iron) TbEC TAKE 1 TABLET ONE TIME DAILY    folic acid (FOLVITE) 1,000 mcg, Oral, Daily    levocetirizine (XYZAL) 5 mg, Nightly    levothyroxine (SYNTHROID) 50 mcg, Oral    lovastatin (MEVACOR) 40 mg, Oral, Nightly    meloxicam (MOBIC) 7.5 MG tablet     omeprazole (PRILOSEC) 20 mg, Oral    venlafaxine (EFFEXOR-XR) 75 mg, Oral       Patient has No Known Allergies.     Patient Care Team:  Flor Henderson FNP as PCP - General (Nurse Practitioner)  Dixon Chino MD as Consulting Physician (Obstetrics and Gynecology)  Selena Pina LPN as Care Coordinator  Imaging, [facility] [place] [place] (Radiology)  Dixon Jonas MD (Surgery)  Kristina Hernandez FNP (Neurosurgery)       Subjective:     Review of Systems    12 point review of systems conducted, negative except as stated in the  "history of present illness. See HPI for details.      Objective:     Visit Vitals  /79 (BP Location: Left arm)   Pulse 79   Temp 98.1 °F (36.7 °C) (Oral)   Resp 18   Ht 5' 2" (1.575 m)   Wt 85.2 kg (187 lb 12.8 oz)   LMP  (LMP Unknown)   SpO2 99%   BMI 34.35 kg/m²       Physical Exam    General: Alert and oriented, No acute distress.  Head: Normocephalic, Atraumatic.  Eye: Pupils are equal, round and reactive to light, Extraocular movements are intact, Sclera non-icteric.  Ears/Nose/Throat: Normal, Mucosa moist,Clear.  Neck/Thyroid: Supple, Non-tender, No carotid bruit, No lymphadenopathy, No JVD, Full range of motion.  Respiratory: Clear to auscultation bilaterally; No wheezes, rales or rhonchi,Non-labored respirations, Symmetrical chest wall expansion.  Cardiovascular: Regular rate and rhythm, S1/S2 normal, No murmurs, rubs or gallops.  Gastrointestinal: Soft, Non-tender, Non-distended, Normal bowel sounds, No palpable organomegaly.  Musculoskeletal: Normal range of motion.  Integumentary: Warm, Dry, Intact, No suspicious lesions or rashes.  Extremities: No clubbing, cyanosis or edema  Neurologic: No focal deficits, Cranial Nerves II-XII are grossly intact, Motor strength normal upper and lower extremities, Sensory exam intact.  Psychiatric: Normal interaction, Coherent speech, Euthymic mood, Appropriate affect     Labs Reviewed:     Chemistry:  Lab Results   Component Value Date     06/18/2024    K 4.5 06/18/2024    BUN 24.6 (H) 06/18/2024    CREATININE 0.80 06/18/2024    EGFRNORACEVR >60 06/18/2024    GLUCOSE 89 06/18/2024    CALCIUM 10.2 06/18/2024    ALKPHOS 101 06/18/2024    LABPROT 6.7 06/18/2024    ALBUMIN 3.9 06/18/2024    BILIDIR 0.1 06/04/2021    IBILI 0.20 06/04/2021    AST 32 06/18/2024    ALT 42 06/18/2024    TLHTULIO19IA 51 06/18/2024    TSH 3.082 06/18/2024    AQHZQL5MXSX 0.89 06/04/2018        Lab Results   Component Value Date    HGBA1C 5.1 06/18/2024        Hematology:  Lab Results "   Component Value Date    WBC 7.46 06/18/2024    HGB 11.8 (L) 06/18/2024    HCT 36.6 (L) 06/18/2024     06/18/2024       Lipid Panel:  Lab Results   Component Value Date    CHOL 213 (H) 06/18/2024    HDL 56 06/18/2024    .00 06/18/2024    TRIG 152 (H) 06/18/2024    TOTALCHOLEST 4 06/18/2024        Urine:  Lab Results   Component Value Date    APPEARANCEUA Slightly Cloudy (A) 10/03/2023    SGUA 1.015 10/03/2023    PROTEINUA Negative 10/03/2023    KETONESUA Negative 10/03/2023    LEUKOCYTESUR Small (A) 10/03/2023    RBCUA 0-2 10/03/2023    WBCUA 11-20 (A) 10/03/2023    BACTERIA Few (A) 10/03/2023          Assessment:       ICD-10-CM ICD-9-CM   1. Depression, unspecified depression type  F32.A 311   2. Obesity, unspecified class, unspecified obesity type, unspecified whether serious comorbidity present  E66.9 278.00        Plan:   1. Depression, unspecified depression type (Primary)  Controlled.  Continue venlafaxine 75 mg p.o. daily.  ED precautions.  Exercise daily. Get sunlight daily.  Practice positive phrases and repeat throughout the day, along with yoga and relaxation techniques.  Establish good social support, make changes to reduce stress.  Reports any symptoms of suicidal/homicidal ideations or self harm immediately, if clinic is closed go to nearest emergency room.    2. Obesity, unspecified class, unspecified obesity type, unspecified whether serious comorbidity present  Body mass index is 34.35 kg/m².  Goal BMI <30.  Exercise 5 times a week for 30 minutes per day.  Avoid soda, simple sugars, excessive rice, potatoes or bread. Limit fast foods and fried foods.  Choose complex carbs in moderation (example: green vegetables, beans, oatmeal). Eat plenty of fresh fruits and vegetables with lean meats daily.  Do not skip meals. Eat a balanced portion size.  Avoid fad diets. Consider permanent healthy life style changes.       Follow up if symptoms worsen or fail to improve.  Keep next scheduled  follow-up appointment in June for wellness exam.  In addition to their scheduled follow up, the patient has also been instructed to follow up on as needed basis.     Future Appointments   Date Time Provider Department Center   1/22/2025  1:30 PM Marlen Duque NP [facility] [place]   1/28/2025 11:00 AM [facility] CT 1 450 LB LIMIT [facility]   2/5/2025  3:00 PM Valentino Chery MD [facility] [place]   6/25/2025 10:00 AM Flor Henderson FNP [facility] FAMMED [facility]   7/15/2025  9:45 AM Dxion Chino MD [facility]        SALAZAR Coughlin

## 2025-01-08 ENCOUNTER — TELEPHONE (OUTPATIENT)
Dept: FAMILY MEDICINE | Facility: CLINIC | Age: 65
End: 2025-01-08
Payer: MEDICARE

## 2025-01-28 ENCOUNTER — HOSPITAL ENCOUNTER (OUTPATIENT)
Dept: RADIOLOGY | Facility: HOSPITAL | Age: 65
Discharge: HOME | End: 2025-01-28
Attending: NURSE PRACTITIONER
Payer: MEDICARE

## 2025-01-28 DIAGNOSIS — M48.062 LUMBAR STENOSIS WITH NEUROGENIC CLAUDICATION: ICD-10-CM

## 2025-01-28 DIAGNOSIS — M47.26 OTHER SPONDYLOSIS WITH RADICULOPATHY, LUMBAR REGION: ICD-10-CM

## 2025-01-28 PROCEDURE — 72131 CT LUMBAR SPINE W/O DYE: CPT | Mod: TC

## 2025-02-05 ENCOUNTER — OFFICE VISIT (OUTPATIENT)
Dept: NEUROSURGERY | Facility: CLINIC | Age: 65
End: 2025-02-05
Payer: MEDICARE

## 2025-02-05 VITALS
SYSTOLIC BLOOD PRESSURE: 126 MMHG | HEART RATE: 77 BPM | HEIGHT: 62 IN | WEIGHT: 184.63 LBS | BODY MASS INDEX: 33.98 KG/M2 | RESPIRATION RATE: 16 BRPM | DIASTOLIC BLOOD PRESSURE: 75 MMHG

## 2025-02-05 DIAGNOSIS — M47.26 OTHER SPONDYLOSIS WITH RADICULOPATHY, LUMBAR REGION: ICD-10-CM

## 2025-02-05 DIAGNOSIS — M47.816 LUMBAR SPONDYLOSIS: Primary | ICD-10-CM

## 2025-02-05 PROCEDURE — 3078F DIAST BP <80 MM HG: CPT | Mod: CPTII,,, | Performed by: STUDENT IN AN ORGANIZED HEALTH CARE EDUCATION/TRAINING PROGRAM

## 2025-02-05 PROCEDURE — 3288F FALL RISK ASSESSMENT DOCD: CPT | Mod: CPTII,,, | Performed by: STUDENT IN AN ORGANIZED HEALTH CARE EDUCATION/TRAINING PROGRAM

## 2025-02-05 PROCEDURE — 3008F BODY MASS INDEX DOCD: CPT | Mod: CPTII,,, | Performed by: STUDENT IN AN ORGANIZED HEALTH CARE EDUCATION/TRAINING PROGRAM

## 2025-02-05 PROCEDURE — 1159F MED LIST DOCD IN RCRD: CPT | Mod: CPTII,,, | Performed by: STUDENT IN AN ORGANIZED HEALTH CARE EDUCATION/TRAINING PROGRAM

## 2025-02-05 PROCEDURE — 1160F RVW MEDS BY RX/DR IN RCRD: CPT | Mod: CPTII,,, | Performed by: STUDENT IN AN ORGANIZED HEALTH CARE EDUCATION/TRAINING PROGRAM

## 2025-02-05 PROCEDURE — 1101F PT FALLS ASSESS-DOCD LE1/YR: CPT | Mod: CPTII,,, | Performed by: STUDENT IN AN ORGANIZED HEALTH CARE EDUCATION/TRAINING PROGRAM

## 2025-02-05 PROCEDURE — 3074F SYST BP LT 130 MM HG: CPT | Mod: CPTII,,, | Performed by: STUDENT IN AN ORGANIZED HEALTH CARE EDUCATION/TRAINING PROGRAM

## 2025-02-05 PROCEDURE — 99214 OFFICE O/P EST MOD 30 MIN: CPT | Mod: ,,, | Performed by: STUDENT IN AN ORGANIZED HEALTH CARE EDUCATION/TRAINING PROGRAM

## 2025-02-05 PROCEDURE — 1125F AMNT PAIN NOTED PAIN PRSNT: CPT | Mod: CPTII,,, | Performed by: STUDENT IN AN ORGANIZED HEALTH CARE EDUCATION/TRAINING PROGRAM

## 2025-02-05 RX ORDER — CETIRIZINE HYDROCHLORIDE 10 MG/1
10 TABLET ORAL
COMMUNITY
Start: 2025-01-07

## 2025-02-05 RX ORDER — CYCLOBENZAPRINE HCL 5 MG
5 TABLET ORAL 3 TIMES DAILY PRN
Qty: 90 TABLET | Refills: 3 | Status: SHIPPED | OUTPATIENT
Start: 2025-02-05 | End: 2025-06-05

## 2025-02-05 RX ORDER — GABAPENTIN 300 MG/1
300 CAPSULE ORAL 3 TIMES DAILY
Qty: 90 CAPSULE | Refills: 11 | Status: SHIPPED | OUTPATIENT
Start: 2025-02-05 | End: 2026-02-05

## 2025-02-05 RX ORDER — CETIRIZINE HYDROCHLORIDE 10 MG/1
10 TABLET ORAL
Qty: 30 TABLET | Refills: 0 | OUTPATIENT
Start: 2025-02-05

## 2025-02-05 NOTE — TELEPHONE ENCOUNTER
----- Message from Jeancarlos sent at 2/5/2025 10:26 AM CST -----  Who Called: Leanne Hahn    Caller is requesting assistance/information from provider's office.    Symptoms (please be specific): n/a     How long has patient had these symptoms:  n/a    List of preferred pharmacies on file (remove unneeded):   [facility] [address] [place] [address]   Phone: [phone]  Fax: [phone]    Preferred Method of Contact: Phone Call    Patient's Preferred Phone Number on File: [phone]     Best Call Back Number, if different:    Additional Information: Pt called to f/u on refill request for her cetirizine (ZYRTEC) 10 MG tablet. Please advise, thank you.

## 2025-02-05 NOTE — TELEPHONE ENCOUNTER
Spoke with pt, reminded med was previously d/c'd and she is currently taking levocetirizine (XYZAL) 5 MG tablet. Pt voiced understanding.

## 2025-03-31 NOTE — PROGRESS NOTES
"    [facility]  Follow-up visit  Neurosurgery      Leanne Hahn   [id number]   1960       SUBJECTIVE:     CHIEF COMPLAINT:  Low back and bilateral leg pain    HPI:  Leanne Hahn is a 64 y.o. female who presents for follow up appointment.   She was last seen in the office on November 8th, 2024.    Per prior HPI:  "The patient presents today describing chronic lower back pain, bilateral hip pain radiating into the right leg.  The patient states she will intermittently have shooting, stabbing and cramping pain radiating into the right leg.  She will intermittently have a give-way sensation to the right leg as well and has experienced falls secondary to this.  She describes new onset numbness into the bilateral hands that began 2 days ago.  The patient reports she has been attending physical therapy although stopped this intervention pending this appointment.  The patient rates the pain 4 on the pain scale. The patient states with standing walking and physical activity the symptoms are worse.  She reports despite utilization of gabapentin, diclofenac, Mobic and Tylenol her symptoms have persisted.  The patient denies disturbances in bowel or bladder function. "    Since her last visit, continues with low back pain as well as bilateral leg pain in no particular dermatomal distribution.  Pain rating 10/10 today.  Sitting and standing for long periods of time make pain worse.  Describes it as "pain all over"    Past Medical History:   Diagnosis Date    Anemia, unspecified     Arthritis     Chronic pain of left knee     Depression, unspecified depression type     GERD (gastroesophageal reflux disease)     Hypercholesterolemia     Hypothyroidism, unspecified type     Neuropathy     Personal history of colonic polyps     Sciatica        Past Surgical History:   Procedure Laterality Date    CHOLECYSTECTOMY      COLONOSCOPY  02/09/2018    Dixon Sumi/Normal exam    OVARIAN CYST REMOVAL      TRANSFORAMINAL " "EPIDURAL INJECTION OF STEROID Bilateral 1/2/2025    Procedure: INJECTION, STEROID, EPIDURAL, TRANSFORAMINAL APPROACH    /////Bilateral TFESI L4;  Surgeon: Noelle Young MD;  [address];  Service: Pain Management;  Laterality: Bilateral;  Bilateral TFESI L4       Family History   Problem Relation Name Age of Onset    Breast cancer Maternal Aunt         Social History     Socioeconomic History    Marital status: Single   Occupational History     Comment: disabled   Tobacco Use    Smoking status: Never    Smokeless tobacco: Never   Substance and Sexual Activity    Alcohol use: Never    Drug use: Never    Sexual activity: Never       Review of patient's allergies indicates:  No Known Allergies     Current Outpatient Medications   Medication Instructions    acetaminophen (TYLENOL ORAL) Take by mouth.    b complex vitamins capsule 1 capsule, Daily    cetirizine (ZYRTEC) 10 mg    cholecalciferol (vitamin D3) (VITAMIN D3) 2,000 Units, Daily    cyclobenzaprine (FLEXERIL) 5 mg, Oral, 3 times daily PRN    ferrous sulfate 324 mg (65 mg iron) TbEC TAKE 1 TABLET ONE TIME DAILY    folic acid (FOLVITE) 1,000 mcg, Oral, Daily    gabapentin (NEURONTIN) 300 mg, Oral, 3 times daily    levocetirizine (XYZAL) 5 mg, Nightly    levothyroxine (SYNTHROID) 50 mcg, Oral    lovastatin (MEVACOR) 40 mg, Oral, Nightly    meloxicam (MOBIC) 7.5 MG tablet     omeprazole (PRILOSEC) 20 mg, Oral    venlafaxine (EFFEXOR-XR) 75 mg, Oral      Review of Systems  12 point review of systems conducted, negative except as stated in the history of present illness. See HPI for details.    OBJECTIVE:     Visit Vitals  /75 (BP Location: Right arm, Patient Position: Sitting)   Pulse 77   Resp 16   Ht 5' 2" (1.575 m)   Wt 83.7 kg (184 lb 9.6 oz)   LMP  (LMP Unknown)   BMI 33.76 kg/m²        General:  Pleasant, Well-nourished, Well-groomed.    Lungs:  Breathing is quiet with non-labored respirations.    Musculoskeletal:   Straight leg raise is " negative bilaterally.    Neurological:  The patient is awake, alert, and oriented in all 4 spheres.  Muscle strength against resistance:  Strength  Deltoids Triceps Biceps Wrist Extension Wrist Flexion Intrinsics   Upper: R 5/5 5/5 5/5 5/5 5/5 5/5    L 5/5 5/5 5/5 5/5 5/5 5/5     Iliopsoas Quadriceps Knee  Flexion Tibialis  anterior Gastro- cnemius EHL   Lower: R 5/5 5/5 5/5 5/5 5/5 5/5    L 5/5 5/5 5/5 5/5 5/5 5/5   Sensation is intact to primary modalities in bilateral lower extremities.  Lillian sign is negative bilaterally.  Gait is normal.    Imaging:  All pertinent neuroimaging independently reviewed. Discussed these findings in detail with the patient.    DIAGNOSES:       ICD-10-CM ICD-9-CM   1. Lumbar spondylosis  M47.816 721.3   2. Other spondylosis with radiculopathy, lumbar region  M47.26 721.3     ASSESSMENT/PLAN:     63yo F h/o hypothyroidism, HLD, GERD presents with chronic low back and BLLE pain in no particular distribution. Degenerative changes on imaging. We discussed her symptoms and imaging findings. I have recommended muscle relaxant PRN as well as Gabapentin. I also ordered EMG to better characterize her radicular symptoms. We will see her back in about three months to see how she is doing.    1. Lumbar spondylosis  -     EMG W/ NERVE CONDUCTION 2 Extremities; Future    2. Other spondylosis with radiculopathy, lumbar region  -     EMG W/ NERVE CONDUCTION 2 Extremities; Future    Other orders  -     gabapentin (NEURONTIN) 300 MG capsule; Take 1 capsule (300 mg total) by mouth 3 (three) times daily.  Dispense: 90 capsule; Refill: 11  -     cyclobenzaprine (FLEXERIL) 5 MG tablet; Take 1 tablet (5 mg total) by mouth 3 (three) times daily as needed for Muscle spasms.  Dispense: 90 tablet; Refill: 3    Follow up in about 3 months (around 5/5/2025) for with MD, Follow-up.    Valentino Brooks MD  Neurosurgery  [facility]    30 minutes were personally spent on this visit including my review  of available records, prior imaging, comprehensive physical and neurologic examination and discussion with the patient.     Disclaimer:  This note is prepared using voice recognition software and as such is likely to have errors despite attempts at proofreading.

## 2025-04-04 ENCOUNTER — PROCEDURE VISIT (OUTPATIENT)
Dept: NEUROLOGY | Facility: CLINIC | Age: 65
End: 2025-04-04
Payer: MEDICARE

## 2025-04-04 DIAGNOSIS — M47.26 OTHER SPONDYLOSIS WITH RADICULOPATHY, LUMBAR REGION: ICD-10-CM

## 2025-04-04 DIAGNOSIS — M47.816 LUMBAR SPONDYLOSIS: ICD-10-CM

## 2025-04-04 NOTE — PROCEDURES
Nerve conductions / EMG performed and full report scanned in chart. (Media tab)   summary comments, if any:   ..        Gabe Gonzales MD

## 2025-05-06 NOTE — PROGRESS NOTES
"Audio Only Telehealth Visit     The patient location is: her home in [place], [place]  The chief complaint leading to consultation is: 3 month follow up to discuss EMG results  Visit type: Virtual visit with audio only (telephone)  Total time spent in medical discussion with patient: 12 minutes  Total time spent on date of the encounter:15 minutes    The reason for the audio only service rather than synchronous audio and video virtual visit was related to technical difficulties or patient preference/necessity.    Each patient to whom I provide medical services by telemedicine is:  (1) informed of the relationship between the physician and patient and the respective role of any other health care provider with respect to management of the patient; and (2) notified that they may decline to receive medical services by telemedicine and may withdraw from such care at any time. Patient verbally consented to receive this service via voice-only telephone call.     HPI:   Leanne Hahn is a 65yo female who is following up today for lumbar complaints.  She continues with chronic low back pain and pain in bilateral legs in no particular dermatomal distribution.  Sitting and standing for long periods of time make the pain worse.  Symptoms have persisted despite physical therapy, injections, medications, and activity modifications.  At her last visit here on 2/5/25, EMG's of the lower extremities were recommended to better characterize her radicular symptoms.  Testing was done on 4/4/25, showing features of bilateral L5, S1 root disease and probable comorbid sensory polyneuropathy.  She is following up today to discuss these results and further treatment options.    She continues with pain "all over" which she rates at 10/10.  Not taking Gabapentin and Flexeril as prescribed.    Assessment and plan:    65yo F h/o hypothyroidism, HLD, GERD presents with chronic low back and BLLE pain.  We discuss symptoms, imaging findings and " recent EMG results.  I recommended again to take Gabapentin a prescribed three times a day which will be helpful for her radiculopathy and comorbid sensory polyneuropathy. Muscle relaxant can be taken up to 3 times daily as well.  Patient states PT didn't work for her but I recommended continuing home exercises for core strengthening as well as continued efforts to lose some weight.  We will see her back in about 6 months to see how she is doing.    Valentino Brooks MD  Neurosurgery  [facility]      This service was not originating from a related E/M service provided within the previous 7 days nor will  to an E/M service or procedure within the next 24 hours or my soonest available appointment.  Prevailing standard of care was able to be met in this audio-only visit.

## 2025-05-07 ENCOUNTER — OFFICE VISIT (OUTPATIENT)
Dept: NEUROSURGERY | Facility: CLINIC | Age: 65
End: 2025-05-07
Payer: MEDICARE

## 2025-05-07 DIAGNOSIS — M48.062 LUMBAR STENOSIS WITH NEUROGENIC CLAUDICATION: ICD-10-CM

## 2025-05-07 DIAGNOSIS — M47.816 LUMBAR SPONDYLOSIS: Primary | ICD-10-CM

## 2025-05-07 PROCEDURE — 1159F MED LIST DOCD IN RCRD: CPT | Mod: CPTII,93,, | Performed by: STUDENT IN AN ORGANIZED HEALTH CARE EDUCATION/TRAINING PROGRAM

## 2025-05-07 PROCEDURE — 1160F RVW MEDS BY RX/DR IN RCRD: CPT | Mod: CPTII,93,, | Performed by: STUDENT IN AN ORGANIZED HEALTH CARE EDUCATION/TRAINING PROGRAM

## 2025-05-07 PROCEDURE — 98012 SYNCH AUDIO-ONLY EST SF 10: CPT | Mod: 93,,, | Performed by: STUDENT IN AN ORGANIZED HEALTH CARE EDUCATION/TRAINING PROGRAM

## 2025-05-07 PROCEDURE — 1101F PT FALLS ASSESS-DOCD LE1/YR: CPT | Mod: CPTII,93,, | Performed by: STUDENT IN AN ORGANIZED HEALTH CARE EDUCATION/TRAINING PROGRAM

## 2025-05-07 PROCEDURE — 3288F FALL RISK ASSESSMENT DOCD: CPT | Mod: CPTII,93,, | Performed by: STUDENT IN AN ORGANIZED HEALTH CARE EDUCATION/TRAINING PROGRAM

## 2025-05-07 RX ORDER — CYCLOBENZAPRINE HCL 5 MG
5 TABLET ORAL 3 TIMES DAILY PRN
Qty: 90 TABLET | Refills: 3 | Status: SHIPPED | OUTPATIENT
Start: 2025-05-07 | End: 2025-09-04

## 2025-05-07 RX ORDER — GABAPENTIN 300 MG/1
300 CAPSULE ORAL 3 TIMES DAILY
Qty: 90 CAPSULE | Refills: 11 | Status: SHIPPED | OUTPATIENT
Start: 2025-05-07 | End: 2026-05-07

## 2025-06-11 ENCOUNTER — OFFICE VISIT (OUTPATIENT)
Dept: FAMILY MEDICINE | Facility: CLINIC | Age: 65
End: 2025-06-11
Payer: MEDICARE

## 2025-06-11 VITALS
HEIGHT: 62 IN | TEMPERATURE: 98 F | DIASTOLIC BLOOD PRESSURE: 70 MMHG | OXYGEN SATURATION: 98 % | HEART RATE: 82 BPM | RESPIRATION RATE: 18 BRPM | SYSTOLIC BLOOD PRESSURE: 104 MMHG | WEIGHT: 182.38 LBS | BODY MASS INDEX: 33.56 KG/M2

## 2025-06-11 DIAGNOSIS — Z00.00 WELLNESS EXAMINATION: Primary | ICD-10-CM

## 2025-06-11 DIAGNOSIS — R30.0 DYSURIA: Primary | ICD-10-CM

## 2025-06-11 DIAGNOSIS — E66.9 OBESITY, UNSPECIFIED CLASS, UNSPECIFIED OBESITY TYPE, UNSPECIFIED WHETHER SERIOUS COMORBIDITY PRESENT: ICD-10-CM

## 2025-06-11 LAB
BILIRUB UR QL STRIP.AUTO: NEGATIVE
CLARITY UR: CLEAR
COLOR UR AUTO: NORMAL
GLUCOSE UR QL STRIP: NEGATIVE
HGB UR QL STRIP: NEGATIVE
KETONES UR QL STRIP: NEGATIVE
LEUKOCYTE ESTERASE UR QL STRIP: NEGATIVE
NITRITE UR QL STRIP: NEGATIVE
PH UR STRIP: 6.5 [PH]
PROT UR QL STRIP: NEGATIVE
SP GR UR STRIP.AUTO: 1.01 (ref 1–1.03)
UROBILINOGEN UR STRIP-ACNC: 0.2

## 2025-06-11 PROCEDURE — 3074F SYST BP LT 130 MM HG: CPT | Mod: ,,, | Performed by: NURSE PRACTITIONER

## 2025-06-11 PROCEDURE — 1160F RVW MEDS BY RX/DR IN RCRD: CPT | Mod: ,,, | Performed by: NURSE PRACTITIONER

## 2025-06-11 PROCEDURE — 3078F DIAST BP <80 MM HG: CPT | Mod: ,,, | Performed by: NURSE PRACTITIONER

## 2025-06-11 PROCEDURE — G2211 COMPLEX E/M VISIT ADD ON: HCPCS | Mod: ,,, | Performed by: NURSE PRACTITIONER

## 2025-06-11 PROCEDURE — 1126F AMNT PAIN NOTED NONE PRSNT: CPT | Mod: ,,, | Performed by: NURSE PRACTITIONER

## 2025-06-11 PROCEDURE — 99213 OFFICE O/P EST LOW 20 MIN: CPT | Mod: ,,, | Performed by: NURSE PRACTITIONER

## 2025-06-11 PROCEDURE — 81003 URINALYSIS AUTO W/O SCOPE: CPT | Performed by: NURSE PRACTITIONER

## 2025-06-11 PROCEDURE — 1159F MED LIST DOCD IN RCRD: CPT | Mod: ,,, | Performed by: NURSE PRACTITIONER

## 2025-06-11 PROCEDURE — 3008F BODY MASS INDEX DOCD: CPT | Mod: ,,, | Performed by: NURSE PRACTITIONER

## 2025-06-11 RX ORDER — NITROFURANTOIN 25; 75 MG/1; MG/1
100 CAPSULE ORAL 2 TIMES DAILY
Qty: 14 CAPSULE | Refills: 0 | Status: SHIPPED | OUTPATIENT
Start: 2025-06-11 | End: 2025-06-18

## 2025-06-11 NOTE — ASSESSMENT & PLAN NOTE
Orders:    nitrofurantoin, macrocrystal-monohydrate, (MACROBID) 100 MG capsule; Take 1 capsule (100 mg total) by mouth 2 (two) times daily. for 7 days    Urinalysis, Reflex to Urine Culture Urine, Clean Catch

## 2025-06-11 NOTE — ASSESSMENT & PLAN NOTE

## 2025-06-11 NOTE — PROGRESS NOTES
Patient ID: [id number]     Chief Complaint: bladder spasm (X1w tart and water mild relief/Denies fever, nausea, vomiting, lower back pain)      HPI:     Leanne Hahn is a 64 y.o. female here today with complaints of dysuria and bladder spasms for 1 week.  She denies fever, chest pain, flank pain, vaginal discharge, hematuria.  She has tried cream of efraín without relief.      Health Maintenance   Topic Date Due    Hepatitis C Screening  Never done    Pneumococcal Vaccines (Age 50+) (1 of 1 - PCV) Never done    TETANUS VACCINE  11/04/2019    COVID-19 Vaccine (10 - 2024-25 season) 09/01/2024    Mammogram  02/21/2025    Hemoglobin A1c (Diabetic Prevention Screening)  06/18/2027    DEXA Scan  06/27/2027    Colorectal Cancer Screening  02/09/2028    Lipid Panel  06/18/2029    Shingles Vaccine  Completed    Influenza Vaccine  Completed    RSV Vaccine (Age 60+ and Pregnant patients)  Completed       Past Medical History:  has a past medical history of Anemia, unspecified, Arthritis, Chronic pain of left knee, Depression, unspecified depression type, GERD (gastroesophageal reflux disease), Hypercholesterolemia, Hypothyroidism, unspecified type, Neuropathy, Personal history of colonic polyps, and Sciatica.    Surgical History:  has a past surgical history that includes Ovarian cyst removal; Cholecystectomy; Colonoscopy (02/09/2018); and Transforaminal epidural injection of steroid (Bilateral, 1/2/2025).    Family History: family history includes Breast cancer in her maternal aunt.    Social History:  reports that she has never smoked. She has never been exposed to tobacco smoke. She has never used smokeless tobacco. She reports that she does not drink alcohol and does not use drugs.    Current Outpatient Medications   Medication Instructions    acetaminophen (TYLENOL ORAL) Take by mouth.    b complex vitamins capsule 1 capsule, Daily    cetirizine (ZYRTEC) 10 mg    cholecalciferol (vitamin D3) (VITAMIN D3) 2,000 Units,  "Daily    cyclobenzaprine (FLEXERIL) 5 mg, Oral, 3 times daily PRN    ferrous sulfate 324 mg (65 mg iron) TbEC TAKE 1 TABLET ONE TIME DAILY    folic acid (FOLVITE) 1,000 mcg, Oral, Daily    gabapentin (NEURONTIN) 300 mg, Oral, 3 times daily    levocetirizine (XYZAL) 5 mg, Nightly    levothyroxine (SYNTHROID) 50 mcg, Oral    lovastatin (MEVACOR) 40 mg, Oral, Nightly    meloxicam (MOBIC) 7.5 MG tablet     nitrofurantoin, macrocrystal-monohydrate, (MACROBID) 100 MG capsule 100 mg, Oral, 2 times daily    omeprazole (PRILOSEC) 20 mg, Oral    venlafaxine (EFFEXOR-XR) 75 mg, Oral       Patient has no known allergies.     Patient Care Team:  Flor Henderson FNP as PCP - General (Nurse Practitioner)  Dixon Chino MD as Consulting Physician (Obstetrics and Gynecology)  Selena Pina LPN as Care Coordinator  [facility] [place] [place] (Radiology)  Dixon Jonas MD (Surgery)  Kristina Hernandez FNP (Neurosurgery)       Subjective:     Review of Systems    12 point review of systems conducted, negative except as stated in the history of present illness. See HPI for details.      Objective:     Visit Vitals  /70   Pulse 82   Temp 98.2 °F (36.8 °C)   Resp 18   Ht 5' 2" (1.575 m)   Wt 82.7 kg (182 lb 6.4 oz)   LMP  (LMP Unknown)   SpO2 98%   BMI 33.36 kg/m²       Physical Exam    General: Alert and oriented, No acute distress.  Head: Normocephalic, Atraumatic.  Eye: Pupils are equal, round and reactive to light, Extraocular movements are intact, Sclera non-icteric.  Ears/Nose/Throat: Normal, Mucosa moist,Clear.  Neck/Thyroid: Supple, Non-tender, No carotid bruit, No lymphadenopathy, No JVD, Full range of motion.  Respiratory: Clear to auscultation bilaterally; No wheezes, rales or rhonchi,Non-labored respirations, Symmetrical chest wall expansion.  Cardiovascular: Regular rate and rhythm, S1/S2 normal, No murmurs, rubs or gallops.  Gastrointestinal: Soft, Non-tender, Non-distended, Normal bowel sounds, No palpable " organomegaly.  Musculoskeletal: Normal range of motion.  Integumentary: Warm, Dry, Intact, No suspicious lesions or rashes.  Extremities: No clubbing, cyanosis or edema  Neurologic: No focal deficits, Cranial Nerves II-XII are grossly intact, Motor strength normal upper and lower extremities, Sensory exam intact.  Psychiatric: Normal interaction, Coherent speech, Euthymic mood, Appropriate affect     Labs Reviewed:     Chemistry:  Lab Results   Component Value Date     06/18/2024    K 4.5 06/18/2024    BUN 24.6 (H) 06/18/2024    CREATININE 0.80 06/18/2024    EGFRNORACEVR >60 06/18/2024    CALCIUM 10.2 06/18/2024    ALKPHOS 101 06/18/2024    ALBUMIN 3.9 06/18/2024    BILIDIR 0.1 06/04/2021    IBILI 0.20 06/04/2021    AST 32 06/18/2024    ALT 42 06/18/2024    [id number] 51 06/18/2024    TSH 3.082 06/18/2024    CFYKMQ3BXNI 0.89 06/04/2018        Lab Results   Component Value Date    HGBA1C 5.1 06/18/2024        Hematology:  Lab Results   Component Value Date    WBC 7.46 06/18/2024    HGB 11.8 (L) 06/18/2024    HCT 36.6 (L) 06/18/2024     06/18/2024       Lipid Panel:  Lab Results   Component Value Date    CHOL 213 (H) 06/18/2024    HDL 56 06/18/2024    .00 06/18/2024    TRIG 152 (H) 06/18/2024    TOTALCHOLEST 4 06/18/2024        Urine:  Lab Results   Component Value Date    APPEARANCEUA Slightly Cloudy (A) 10/03/2023    SGUA 1.015 10/03/2023    PROTEINUA Negative 10/03/2023    KETONESUA Negative 10/03/2023    LEUKOCYTESUR Small (A) 10/03/2023    RBCUA 0-2 10/03/2023    WBCUA 11-20 (A) 10/03/2023    BACTERIA Few (A) 10/03/2023          Assessment/Plan     Assessment & Plan  Dysuria  Orders:    nitrofurantoin, macrocrystal-monohydrate, (MACROBID) 100 MG capsule; Take 1 capsule (100 mg total) by mouth 2 (two) times daily. for 7 days    Urinalysis, Reflex to Urine Culture Urine, Clean Catch    Obesity, unspecified class, unspecified obesity type, unspecified whether serious comorbidity present  Body  mass index is 33.36 kg/m².  Goal BMI <30.  Exercise 5 times a week for 30 minutes per day.  Avoid soda, simple sugars, excessive rice, potatoes or bread. Limit fast foods and fried foods.  Choose complex carbs in moderation (example: green vegetables, beans, oatmeal). Eat plenty of fresh fruits and vegetables with lean meats daily.  Do not skip meals. Eat a balanced portion size.  Avoid fad diets. Consider permanent healthy life style changes.          Follow up if symptoms worsen or fail to improve. In addition to their scheduled follow up, the patient has also been instructed to follow up on as needed basis.     Future Appointments   Date Time Provider Department Center   6/11/2025  3:00 PM Flor Henderson FNP [facility]   6/25/2025 10:00 AM Flor Henderson FNP [facility]   7/15/2025  9:45 AM Dixon Chino MD [facility] [place]   11/5/2025  1:00 PM Valentino Chery MD [facility] SHARONA [place]        SALAZAR Coughlin

## 2025-06-12 ENCOUNTER — RESULTS FOLLOW-UP (OUTPATIENT)
Dept: FAMILY MEDICINE | Facility: CLINIC | Age: 65
End: 2025-06-12

## 2025-06-17 ENCOUNTER — RESULTS FOLLOW-UP (OUTPATIENT)
Dept: FAMILY MEDICINE | Facility: CLINIC | Age: 65
End: 2025-06-17

## 2025-06-17 ENCOUNTER — LAB VISIT (OUTPATIENT)
Dept: LAB | Facility: HOSPITAL | Age: 65
End: 2025-06-17
Attending: NURSE PRACTITIONER
Payer: MEDICARE

## 2025-06-17 DIAGNOSIS — Z00.00 WELLNESS EXAMINATION: ICD-10-CM

## 2025-06-17 LAB
25(OH)D3+25(OH)D2 SERPL-MCNC: 66 NG/ML (ref 30–80)
ALBUMIN SERPL-MCNC: 3.8 G/DL (ref 3.4–4.8)
ALBUMIN/GLOB SERPL: 1 RATIO (ref 1.1–2)
ALP SERPL-CCNC: 98 UNIT/L (ref 40–150)
ALT SERPL-CCNC: 33 UNIT/L (ref 0–55)
ANION GAP SERPL CALC-SCNC: 6 MEQ/L
AST SERPL-CCNC: 36 UNIT/L (ref 11–45)
BASOPHILS # BLD AUTO: 0.03 X10(3)/MCL
BASOPHILS NFR BLD AUTO: 0.4 %
BILIRUB SERPL-MCNC: 0.5 MG/DL
BUN SERPL-MCNC: 17.1 MG/DL (ref 9.8–20.1)
CALCIUM SERPL-MCNC: 9.5 MG/DL (ref 8.4–10.2)
CHLORIDE SERPL-SCNC: 103 MMOL/L (ref 98–107)
CHOLEST SERPL-MCNC: 228 MG/DL
CHOLEST/HDLC SERPL: 5 {RATIO} (ref 0–5)
CO2 SERPL-SCNC: 31 MMOL/L (ref 23–31)
CREAT SERPL-MCNC: 0.69 MG/DL (ref 0.55–1.02)
CREAT/UREA NIT SERPL: 25
EOSINOPHIL # BLD AUTO: 0.23 X10(3)/MCL (ref 0–0.9)
EOSINOPHIL NFR BLD AUTO: 2.9 %
ERYTHROCYTE [DISTWIDTH] IN BLOOD BY AUTOMATED COUNT: 12.4 % (ref 11.5–17)
EST. AVERAGE GLUCOSE BLD GHB EST-MCNC: 96.8 MG/DL
GFR SERPLBLD CREATININE-BSD FMLA CKD-EPI: >60 ML/MIN/1.73/M2
GLOBULIN SER-MCNC: 3.7 GM/DL (ref 2.4–3.5)
GLUCOSE SERPL-MCNC: 88 MG/DL (ref 82–115)
HBA1C MFR BLD: 5 %
HCT VFR BLD AUTO: 39.3 % (ref 37–47)
HDLC SERPL-MCNC: 50 MG/DL (ref 35–60)
HGB BLD-MCNC: 12.8 G/DL (ref 12–16)
IMM GRANULOCYTES # BLD AUTO: 0.01 X10(3)/MCL (ref 0–0.04)
IMM GRANULOCYTES NFR BLD AUTO: 0.1 %
LDLC SERPL CALC-MCNC: 139 MG/DL (ref 50–140)
LYMPHOCYTES # BLD AUTO: 2.21 X10(3)/MCL (ref 0.6–4.6)
LYMPHOCYTES NFR BLD AUTO: 27.8 %
MCH RBC QN AUTO: 31.9 PG (ref 27–31)
MCHC RBC AUTO-ENTMCNC: 32.6 G/DL (ref 33–36)
MCV RBC AUTO: 98 FL (ref 80–94)
MONOCYTES # BLD AUTO: 0.73 X10(3)/MCL (ref 0.1–1.3)
MONOCYTES NFR BLD AUTO: 9.2 %
NEUTROPHILS # BLD AUTO: 4.75 X10(3)/MCL (ref 2.1–9.2)
NEUTROPHILS NFR BLD AUTO: 59.6 %
PLATELET # BLD AUTO: 373 X10(3)/MCL (ref 130–400)
PMV BLD AUTO: 9.7 FL (ref 7.4–10.4)
POTASSIUM SERPL-SCNC: 4.1 MMOL/L (ref 3.5–5.1)
PROT SERPL-MCNC: 7.5 GM/DL (ref 5.8–7.6)
RBC # BLD AUTO: 4.01 X10(6)/MCL (ref 4.2–5.4)
SODIUM SERPL-SCNC: 140 MMOL/L (ref 136–145)
TRIGL SERPL-MCNC: 195 MG/DL (ref 37–140)
TSH SERPL-ACNC: 2.97 UIU/ML (ref 0.35–4.94)
VLDLC SERPL CALC-MCNC: 39 MG/DL
WBC # BLD AUTO: 7.96 X10(3)/MCL (ref 4.5–11.5)

## 2025-06-17 PROCEDURE — 82306 VITAMIN D 25 HYDROXY: CPT

## 2025-06-17 PROCEDURE — 84443 ASSAY THYROID STIM HORMONE: CPT

## 2025-06-17 PROCEDURE — 36415 COLL VENOUS BLD VENIPUNCTURE: CPT

## 2025-06-17 PROCEDURE — 83036 HEMOGLOBIN GLYCOSYLATED A1C: CPT

## 2025-06-17 PROCEDURE — 80061 LIPID PANEL: CPT

## 2025-06-17 PROCEDURE — 80053 COMPREHEN METABOLIC PANEL: CPT

## 2025-06-17 PROCEDURE — 85025 COMPLETE CBC W/AUTO DIFF WBC: CPT

## 2025-06-25 ENCOUNTER — OFFICE VISIT (OUTPATIENT)
Dept: FAMILY MEDICINE | Facility: CLINIC | Age: 65
End: 2025-06-25
Payer: MEDICARE

## 2025-06-25 VITALS
HEIGHT: 62 IN | RESPIRATION RATE: 18 BRPM | BODY MASS INDEX: 33.55 KG/M2 | WEIGHT: 182.31 LBS | OXYGEN SATURATION: 97 % | DIASTOLIC BLOOD PRESSURE: 62 MMHG | HEART RATE: 82 BPM | SYSTOLIC BLOOD PRESSURE: 97 MMHG | TEMPERATURE: 98 F

## 2025-06-25 DIAGNOSIS — E66.9 OBESITY, UNSPECIFIED CLASS, UNSPECIFIED OBESITY TYPE, UNSPECIFIED WHETHER SERIOUS COMORBIDITY PRESENT: ICD-10-CM

## 2025-06-25 DIAGNOSIS — R26.89 DECREASED MOBILITY: Primary | ICD-10-CM

## 2025-06-25 DIAGNOSIS — Z00.00 WELLNESS EXAMINATION: ICD-10-CM

## 2025-06-25 DIAGNOSIS — E78.00 HYPERCHOLESTEROLEMIA: ICD-10-CM

## 2025-06-25 DIAGNOSIS — E03.9 HYPOTHYROIDISM, UNSPECIFIED TYPE: ICD-10-CM

## 2025-06-25 PROCEDURE — 3074F SYST BP LT 130 MM HG: CPT | Mod: ,,, | Performed by: NURSE PRACTITIONER

## 2025-06-25 PROCEDURE — 3044F HG A1C LEVEL LT 7.0%: CPT | Mod: ,,, | Performed by: NURSE PRACTITIONER

## 2025-06-25 PROCEDURE — 1101F PT FALLS ASSESS-DOCD LE1/YR: CPT | Mod: ,,, | Performed by: NURSE PRACTITIONER

## 2025-06-25 PROCEDURE — G0439 PPPS, SUBSEQ VISIT: HCPCS | Mod: ,,, | Performed by: NURSE PRACTITIONER

## 2025-06-25 PROCEDURE — 3288F FALL RISK ASSESSMENT DOCD: CPT | Mod: ,,, | Performed by: NURSE PRACTITIONER

## 2025-06-25 PROCEDURE — 1159F MED LIST DOCD IN RCRD: CPT | Mod: ,,, | Performed by: NURSE PRACTITIONER

## 2025-06-25 PROCEDURE — 1160F RVW MEDS BY RX/DR IN RCRD: CPT | Mod: ,,, | Performed by: NURSE PRACTITIONER

## 2025-06-25 PROCEDURE — 3078F DIAST BP <80 MM HG: CPT | Mod: ,,, | Performed by: NURSE PRACTITIONER

## 2025-06-25 RX ORDER — ROSUVASTATIN CALCIUM 20 MG/1
20 TABLET, COATED ORAL NIGHTLY
Qty: 30 TABLET | Refills: 3 | Status: SHIPPED | OUTPATIENT
Start: 2025-06-25 | End: 2026-06-25

## 2025-06-25 NOTE — ASSESSMENT & PLAN NOTE
Lab Results   Component Value Date    .00 06/17/2025    TRIG 195 (H) 06/17/2025    HDL 50 06/17/2025    TOTALCHOLEST 5 06/17/2025   Not at goal.  DC lovastatin 40 mg.  Start rosuvastatin 20 mg p.o. at bedtime.  Repeat lipid panel in 3 months.  Stressed importance of dietary modifications. Follow a low cholesterol, low saturated fat diet with less that 200mg of cholesterol a day.  Avoid fried foods and high saturated fats (high saturated fats less than 7% of calories).  Add Flax Seed/Fish Oil supplements to diet. Increase dietary fiber.  Regular exercise can reduce LDL and raise HDL. Stressed importance of physical activity 5 times per week for 30 minutes per day.     Orders:    rosuvastatin (CRESTOR) 20 MG tablet; Take 1 tablet (20 mg total) by mouth every evening.

## 2025-06-25 NOTE — PROGRESS NOTES
Internal Medicine    Leanne Hahn is a 64 y.o. female here today for a Medicare Annual Wellness visit and comprehensive Health Risk Assessment.  She presents with complaints of lower back pain.  Symptoms are worse when going from a sitting to a standing position.  Patient denies any recent trauma or injury.  Patient rates pain 5/10.  Patient describes pain as aching sensation.  Reports relief with NSAIDs.    Health Maintenance   Topic Date Due    Hepatitis C Screening  Never done    Pneumococcal Vaccines (Age 50+) (1 of 1 - PCV) Never done    TETANUS VACCINE  11/04/2019    COVID-19 Vaccine (10 - 2024-25 season) 09/01/2024    Mammogram  02/21/2025    Influenza Vaccine (1) 09/01/2025    High Dose Statin  06/25/2026    Colorectal Cancer Screening  02/09/2028    Hemoglobin A1c (Diabetic Prevention Screening)  06/17/2028    DEXA Scan  06/27/2029    Lipid Panel  06/17/2030    Shingles Vaccine  Completed    RSV Vaccine (Age 60+ and Pregnant patients)  Completed       Past Medical History:  has a past medical history of Anemia, unspecified, Arthritis, Chronic pain of left knee, Depression, unspecified depression type, GERD (gastroesophageal reflux disease), Hypercholesterolemia, Hypothyroidism, unspecified type, Neuropathy, Personal history of colonic polyps, and Sciatica.     Surgical History:  has a past surgical history that includes Ovarian cyst removal; Cholecystectomy; Colonoscopy (02/09/2018); and Transforaminal epidural injection of steroid (Bilateral, 1/2/2025).     Family History: family history includes Breast cancer in her maternal aunt.      Social History:  reports that she has never smoked. She has never been exposed to tobacco smoke. She has never used smokeless tobacco. She reports that she does not drink alcohol and does not use drugs.     Current Outpatient Medications   Medication Instructions    acetaminophen (TYLENOL ORAL) Take by mouth.    b complex vitamins capsule 1 capsule, Daily    cetirizine  "(ZYRTEC) 10 mg    cholecalciferol (vitamin D3) (VITAMIN D3) 2,000 Units, Daily    cyclobenzaprine (FLEXERIL) 5 mg, Oral, 3 times daily PRN    ferrous sulfate 324 mg (65 mg iron) TbEC TAKE 1 TABLET ONE TIME DAILY    folic acid (FOLVITE) 1,000 mcg, Oral, Daily    gabapentin (NEURONTIN) 300 mg, Oral, 3 times daily    levothyroxine (SYNTHROID) 50 mcg, Oral    meloxicam (MOBIC) 7.5 MG tablet     omeprazole (PRILOSEC) 20 mg, Oral    rosuvastatin (CRESTOR) 20 mg, Oral, Nightly    venlafaxine (EFFEXOR-XR) 75 mg, Oral        Patient has no known allergies.     Patient Care Team:  Flor Henderson FNP as PCP - General (Nurse Practitioner)  Dixon Chino MD as Consulting Physician (Obstetrics and Gynecology)  Selena Pina LPN as Care Coordinator  [facility] (Radiology)  Dixon Jonas MD (Surgery)  Kristina Hernandez FNP (Neurosurgery)     Subjective   The following components were reviewed and updated:  Medical history  Family History  Social history  Allergies  Current Medications  Immunizations  Health Maintenance  Patient Care Team    Review of Systems    12 point review of systems conducted, negative except as stated in the history of present illness. See HPI for details.     Objective   Visit Vitals  BP 97/62 (BP Location: Left arm, Patient Position: Sitting)   Pulse 82   Temp 98 °F (36.7 °C) (Oral)   Resp 18   Ht 5' 2" (1.575 m)   Wt 82.7 kg (182 lb 4.8 oz)   LMP  (LMP Unknown)   SpO2 97%   BMI 33.34 kg/m²        Physical Exam    General: Alert and oriented, No acute distress.  Head: Normocephalic, Atraumatic.  Eye: Pupils are equal, round and reactive to light, Extraocular movements are intact, Sclera non-icteric.  Ears/Nose/Throat: Normal, Mucosa moist,Clear.  Neck/Thyroid: Supple, Non-tender, No carotid bruit, No lymphadenopathy, No JVD, Full range of motion.  Respiratory: Clear to auscultation bilaterally; No wheezes, rales or rhonchi,Non-labored respirations, Symmetrical chest wall " expansion.  Cardiovascular: Regular rate and rhythm, S1/S2 normal, No murmurs, rubs or gallops.  Gastrointestinal: Soft, Non-tender, Non-distended, Normal bowel sounds, No palpable organomegaly.  Musculoskeletal:  Decreased range of motion to lumbar spine.  No red flags.  Integumentary: Warm, Dry, Intact, No suspicious lesions or rashes.  Extremities: No clubbing, cyanosis or edema  Neurologic: No focal deficits, Cranial Nerves II-XII are grossly intact, Motor strength normal upper and lower extremities, Sensory exam intact.  Psychiatric: Normal interaction, Coherent speech, Euthymic mood, Appropriate affect     Labs Reviewed    Chemistry:  Lab Results   Component Value Date     06/17/2025    K 4.1 06/17/2025    BUN 17.1 06/17/2025    CREATININE 0.69 06/17/2025    EGFRNORACEVR >60 06/17/2025    CALCIUM 9.5 06/17/2025    ALKPHOS 98 06/17/2025    ALBUMIN 3.8 06/17/2025    BILIDIR 0.1 06/04/2021    IBILI 0.20 06/04/2021    AST 36 06/17/2025    ALT 33 06/17/2025    [id number] 66 06/17/2025    TSH 2.968 06/17/2025    [id number] 0.89 06/04/2018        Lab Results   Component Value Date    HGBA1C 5.0 06/17/2025        Hematology:  Lab Results   Component Value Date    WBC 7.96 06/17/2025    HGB 12.8 06/17/2025    HCT 39.3 06/17/2025     06/17/2025       Lipid Panel:  Lab Results   Component Value Date    CHOL 228 (H) 06/17/2025    HDL 50 06/17/2025    .00 06/17/2025    TRIG 195 (H) 06/17/2025    TOTALCHOLEST 5 06/17/2025        Urine:  Lab Results   Component Value Date    APPEARANCEUA Clear 06/11/2025    SGUA 1.010 06/11/2025    PROTEINUA Negative 06/11/2025    KETONESUA Negative 06/11/2025    LEUKOCYTESUR Negative 06/11/2025    RBCUA 0-2 10/03/2023    WBCUA 11-20 (A) 10/03/2023    BACTERIA Few (A) 10/03/2023           Assessment/Plan:  Assessment & Plan  Wellness examination  Healthy lifestyle discussed.  Mammogram ordered.  Hypercholesterolemia  Lab Results   Component Value Date    .00  06/17/2025    TRIG 195 (H) 06/17/2025    HDL 50 06/17/2025    TOTALCHOLEST 5 06/17/2025   Not at goal.  DC lovastatin 40 mg.  Start rosuvastatin 20 mg p.o. at bedtime.  Repeat lipid panel in 3 months.  Stressed importance of dietary modifications. Follow a low cholesterol, low saturated fat diet with less that 200mg of cholesterol a day.  Avoid fried foods and high saturated fats (high saturated fats less than 7% of calories).  Add Flax Seed/Fish Oil supplements to diet. Increase dietary fiber.  Regular exercise can reduce LDL and raise HDL. Stressed importance of physical activity 5 times per week for 30 minutes per day.     Orders:    rosuvastatin (CRESTOR) 20 MG tablet; Take 1 tablet (20 mg total) by mouth every evening.    Hypothyroidism, unspecified type  Lab Results   Component Value Date    TSH 2.968 06/17/2025    [id number] 0.89 06/04/2018    Continue Levothyroxine 50 mcg p.o. daily.  Take medicine on an empty stomach with water (no other medications or beverages). Wait 30 minutes to eat or drink.  Report any symptoms of thinning hair, breaking nails, fatigue, weight gain or loss, palpitations.     Obesity, unspecified class, unspecified obesity type, unspecified whether serious comorbidity present  Body mass index is 33.34 kg/m².  Goal BMI <30.  Exercise 5 times a week for 30 minutes per day.  Avoid soda, simple sugars, excessive rice, potatoes or bread. Limit fast foods and fried foods.  Choose complex carbs in moderation (example: green vegetables, beans, oatmeal). Eat plenty of fresh fruits and vegetables with lean meats daily.  Do not skip meals. Eat a balanced portion size.  Avoid fad diets. Consider permanent healthy life style changes.     Decreased mobility  Orders:    WALKER FOR HOME USE      A comprehensive HEALTH RISK ASSESSMENT was completed today. Results are summarized below:    There are NO EMOTIONAL/SOCIAL CONCERNS identified on today's screening for Social Isolation, Depression and  Anxiety.    There are NO COGNITIVE FUNCTION CONCERNS identified on today's screening.  The following FUNCTIONAL AND/OR SAFETY CONCERNS were identified on today's screening for Physical Symptoms, Nutritional, Home Safety/Living Situation, Fall Risk, Activities of Daily Living, Independent Activities of Daily Living, Physical Activity,Timed Up and Go test and Whisper test::  *Patient reports NO ROUTINE EXERCISE. ( )   *Patient reports NO PREVENTIVE HOME HAZARD EVALUATION OR MODIFICATION. ( )    The patient reports NO OPIOID PRESCRIPTIONS. This was confirmed through medication reconciliation.    The patient is NOT A TOBACCO USER.        All Questions regarding food, transportation or housing were not answered today.    Education, counseling, and referrals were provided as documented above and can be viewed in the After Visit Summary.    Follow up in about 3 months (around 9/25/2025) for Hyperlipidemia. In addition to this scheduled follow up, the patient has also been instructed to follow up on as needed basis.     Advance Care Planning   Today we discussed advance care planning. She is interested in learning more about how to make Advance Directives. Information was provided and I offered to discuss more at her discretion.    SALAZAR Coughlin

## 2025-06-25 NOTE — ASSESSMENT & PLAN NOTE
Lab Results   Component Value Date    TSH 2.968 06/17/2025    [id number] 0.89 06/04/2018    Continue Levothyroxine 50 mcg p.o. daily.  Take medicine on an empty stomach with water (no other medications or beverages). Wait 30 minutes to eat or drink.  Report any symptoms of thinning hair, breaking nails, fatigue, weight gain or loss, palpitations.

## 2025-06-25 NOTE — ASSESSMENT & PLAN NOTE

## 2025-06-25 NOTE — ASSESSMENT & PLAN NOTE
Lab Results   Component Value Date    .00 06/17/2025    TRIG 195 (H) 06/17/2025    HDL 50 06/17/2025    TOTALCHOLEST 5 06/17/2025     Not at goal.          Stressed importance of dietary modifications. Follow a low cholesterol, low saturated fat diet with less that 200mg of cholesterol a day.  Avoid fried foods and high saturated fats (high saturated fats less than 7% of calories).  Add Flax Seed/Fish Oil supplements to diet. Increase dietary fiber.  Regular exercise can reduce LDL and raise HDL. Stressed importance of physical activity 5 times per week for 30 minutes per day.

## 2025-06-26 ENCOUNTER — TELEPHONE (OUTPATIENT)
Dept: FAMILY MEDICINE | Facility: CLINIC | Age: 65
End: 2025-06-26
Payer: MEDICARE

## 2025-06-26 NOTE — TELEPHONE ENCOUNTER
Copied from CRM #[id number]. Topic: General Inquiry - Patient Advice  >> Jun 26, 2025  1:06 PM Radha wrote:  .Who Called: Leanne Hahn    Caller is requesting assistance/information from provider's office.    Symptoms (please be specific): na   How long has patient had these symptoms:  na  List of preferred pharmacies on file (remove unneeded): [unfilled]  If different, enter pharmacy into here including location and phone number: na      Preferred Method of Contact: Phone Call  Patient's Preferred Phone Number on File: [phone]   Best Call Back Number, if different:  Additional Information: pt wants the nurse to call her regarding information for her walker

## 2025-06-26 NOTE — TELEPHONE ENCOUNTER
Copied from CRM #[id number]. Topic: General Inquiry - Return Call  >> Jun 26, 2025 11:28 AM Jody wrote:  .Who Called: Leanne Hahn    Caller is requesting assistance/information from provider's office.    Symptoms (please be specific): pt states that she want to discuss the walker    How long has patient had these symptoms:  n/a   List of preferred pharmacies on file (remove unneeded): [unfilled]  If different, enter pharmacy into here including location and phone number: n/a       Preferred Method of Contact: Phone Call  Patient's Preferred Phone Number on File: [phone]   Best Call Back Number, if different:  Additional Information:

## 2025-06-26 NOTE — TELEPHONE ENCOUNTER
Copied from CRM #[id number]. Topic: General Inquiry - Patient Advice  >> Jun 26, 2025  9:42 AM Lisa wrote:  .Who Called: Leanne Hahn    Caller is requesting assistance/information from provider's office.    Symptoms (please be specific):    How long has patient had these symptoms:    List of preferred pharmacies on file (remove unneeded): [unfilled]  If different, enter pharmacy into here including location and phone number:       Preferred Method of Contact: Phone Call  Patient's Preferred Phone Number on File: [phone]   Best Call Back Number, if different:  Additional Information: discuss walker

## 2025-07-28 ENCOUNTER — PATIENT OUTREACH (OUTPATIENT)
Facility: CLINIC | Age: 65
End: 2025-07-28
Payer: MEDICARE

## 2025-07-28 ENCOUNTER — LAB VISIT (OUTPATIENT)
Dept: LAB | Facility: HOSPITAL | Age: 65
End: 2025-07-28
Attending: NURSE PRACTITIONER
Payer: MEDICARE

## 2025-07-28 ENCOUNTER — OFFICE VISIT (OUTPATIENT)
Dept: FAMILY MEDICINE | Facility: CLINIC | Age: 65
End: 2025-07-28
Payer: MEDICARE

## 2025-07-28 ENCOUNTER — TELEPHONE (OUTPATIENT)
Dept: FAMILY MEDICINE | Facility: CLINIC | Age: 65
End: 2025-07-28

## 2025-07-28 VITALS
DIASTOLIC BLOOD PRESSURE: 80 MMHG | BODY MASS INDEX: 32.64 KG/M2 | HEART RATE: 85 BPM | HEIGHT: 62 IN | OXYGEN SATURATION: 99 % | SYSTOLIC BLOOD PRESSURE: 124 MMHG | TEMPERATURE: 98 F | RESPIRATION RATE: 18 BRPM | WEIGHT: 177.38 LBS

## 2025-07-28 DIAGNOSIS — R30.0 DYSURIA: Primary | ICD-10-CM

## 2025-07-28 DIAGNOSIS — R30.0 DYSURIA: ICD-10-CM

## 2025-07-28 DIAGNOSIS — E66.811 CLASS 1 OBESITY WITH BODY MASS INDEX (BMI) OF 32.0 TO 32.9 IN ADULT, UNSPECIFIED OBESITY TYPE, UNSPECIFIED WHETHER SERIOUS COMORBIDITY PRESENT: ICD-10-CM

## 2025-07-28 LAB
BACTERIA #/AREA URNS AUTO: ABNORMAL /HPF
BILIRUB UR QL STRIP.AUTO: NEGATIVE
CLARITY UR: CLEAR
COLOR UR AUTO: ABNORMAL
GLUCOSE UR QL STRIP: NEGATIVE
HGB UR QL STRIP: ABNORMAL
KETONES UR QL STRIP: NEGATIVE
LEUKOCYTE ESTERASE UR QL STRIP: ABNORMAL
NITRITE UR QL STRIP: NEGATIVE
PH UR STRIP: 6.5 [PH]
PROT UR QL STRIP: NEGATIVE
RBC #/AREA URNS AUTO: ABNORMAL /HPF
SP GR UR STRIP.AUTO: <=1.005 (ref 1–1.03)
SQUAMOUS #/AREA URNS AUTO: ABNORMAL /HPF
UROBILINOGEN UR STRIP-ACNC: 0.2
WBC #/AREA URNS AUTO: ABNORMAL /HPF

## 2025-07-28 PROCEDURE — 3008F BODY MASS INDEX DOCD: CPT | Mod: ,,, | Performed by: NURSE PRACTITIONER

## 2025-07-28 PROCEDURE — G2211 COMPLEX E/M VISIT ADD ON: HCPCS | Mod: ,,, | Performed by: NURSE PRACTITIONER

## 2025-07-28 PROCEDURE — 1126F AMNT PAIN NOTED NONE PRSNT: CPT | Mod: ,,, | Performed by: NURSE PRACTITIONER

## 2025-07-28 PROCEDURE — 3074F SYST BP LT 130 MM HG: CPT | Mod: ,,, | Performed by: NURSE PRACTITIONER

## 2025-07-28 PROCEDURE — 99213 OFFICE O/P EST LOW 20 MIN: CPT | Mod: ,,, | Performed by: NURSE PRACTITIONER

## 2025-07-28 PROCEDURE — 3079F DIAST BP 80-89 MM HG: CPT | Mod: ,,, | Performed by: NURSE PRACTITIONER

## 2025-07-28 PROCEDURE — 81003 URINALYSIS AUTO W/O SCOPE: CPT

## 2025-07-28 PROCEDURE — 3288F FALL RISK ASSESSMENT DOCD: CPT | Mod: ,,, | Performed by: NURSE PRACTITIONER

## 2025-07-28 PROCEDURE — 3044F HG A1C LEVEL LT 7.0%: CPT | Mod: ,,, | Performed by: NURSE PRACTITIONER

## 2025-07-28 PROCEDURE — 1159F MED LIST DOCD IN RCRD: CPT | Mod: ,,, | Performed by: NURSE PRACTITIONER

## 2025-07-28 PROCEDURE — 1101F PT FALLS ASSESS-DOCD LE1/YR: CPT | Mod: ,,, | Performed by: NURSE PRACTITIONER

## 2025-07-28 PROCEDURE — 1160F RVW MEDS BY RX/DR IN RCRD: CPT | Mod: ,,, | Performed by: NURSE PRACTITIONER

## 2025-07-28 NOTE — TELEPHONE ENCOUNTER
Copied from CRM #[id number]. Topic: General Inquiry - Information Request  >> Jul 28, 2025  3:16 PM Viri wrote:  Who Called: Leanne Hahn    Caller is requesting information on test results.    Name of Test (Lab/Mammo/Etc): urinalysis  Date of Test: 07/28/25  Where the test was performed:   Ordering Provider:     Preferred Method of Contact: Phone Call  Patient's Preferred Phone Number on File: [phone]   Best Call Back Number, if different:  Additional Information: Pt is calling about results. Please advise.

## 2025-07-28 NOTE — PROGRESS NOTES
Health Maintenance Topic(s) Outreach Outcomes & Actions Taken:    Breast Cancer Screening - Outreach Outcomes & Actions Taken  : External Records Requested & Care Team Updated if Applicable       Additional Notes:  Request Mammogram Report: [facility]

## 2025-07-28 NOTE — LETTER
AUTHORIZATION FOR RELEASE OF CONFIDENTIAL INFORMATION      2025      Dear [facility],    We are seeing Leanne Hahn, date of birth 1960, in the clinic at [facility].  Flor Henderson FNP is the patient's PCP. Leanne Hahn has an outstanding lab/procedure at the time we reviewed his chart.  In order to help keep her health information updated, Leanne has authorized us to request the following medical record(s):        Mammogram         Please fax any records to Flor Henderson FNP's at  [phone]    If you have any questions, please contact  [facility] @ [phone]             Patient Name: Leanne Hahn  :1960  Patient Phone #:[phone]

## 2025-07-28 NOTE — ASSESSMENT & PLAN NOTE

## 2025-07-28 NOTE — PROGRESS NOTES
Patient ID: [id number]     Chief Complaint: Spasms (Pt having bladder spasms tingling)      HPI:     Leanne Hahn is a 64 y.o. female here today dysuria x1 week.  Patient denies fever, hematuria, pyuria or flank pain.    Health Maintenance   Topic Date Due    Hepatitis C Screening  Never done    Pneumococcal Vaccines (Age 50+) (1 of 1 - PCV) Never done    TETANUS VACCINE  11/04/2019    COVID-19 Vaccine (10 - 2024-25 season) 09/01/2024    Mammogram  02/21/2025    Influenza Vaccine (1) 09/01/2025    High Dose Statin  07/28/2026    Colorectal Cancer Screening  02/09/2028    Hemoglobin A1c (Diabetic Prevention Screening)  06/17/2028    DEXA Scan  06/27/2029    Lipid Panel  06/17/2030    Shingles Vaccine  Completed    RSV Vaccine (Age 60+ and Pregnant patients)  Completed       Past Medical History:  has a past medical history of Anemia, unspecified, Arthritis, Chronic pain of left knee, Depression, unspecified depression type, GERD (gastroesophageal reflux disease), Hypercholesterolemia, Hypothyroidism, unspecified type, Neuropathy, Personal history of colonic polyps, and Sciatica.    Surgical History:  has a past surgical history that includes Ovarian cyst removal; Cholecystectomy; Colonoscopy (02/09/2018); and Transforaminal epidural injection of steroid (Bilateral, 1/2/2025).    Family History: family history includes Breast cancer in her maternal aunt.    Social History:  reports that she has never smoked. She has never been exposed to tobacco smoke. She has never used smokeless tobacco. She reports that she does not drink alcohol and does not use drugs.    Current Outpatient Medications   Medication Instructions    acetaminophen (TYLENOL ORAL) Take by mouth.    b complex vitamins capsule 1 capsule, Daily    cetirizine (ZYRTEC) 10 mg    cholecalciferol (vitamin D3) (VITAMIN D3) 2,000 Units, Daily    cyclobenzaprine (FLEXERIL) 5 mg, Oral, 3 times daily PRN    ferrous sulfate 324 mg (65 mg iron) TbEC TAKE 1 TABLET  "ONE TIME DAILY    folic acid (FOLVITE) 1,000 mcg, Oral, Daily    gabapentin (NEURONTIN) 300 mg, Oral, 3 times daily    levothyroxine (SYNTHROID) 50 mcg, Oral    meloxicam (MOBIC) 7.5 MG tablet     omeprazole (PRILOSEC) 20 mg, Oral    rosuvastatin (CRESTOR) 20 mg, Oral, Nightly    venlafaxine (EFFEXOR-XR) 75 mg, Oral       Patient has no known allergies.     Patient Care Team:  Flor Henderson FNP as PCP - General (Nurse Practitioner)  Dixon Chino MD as Consulting Physician (Obstetrics and Gynecology)  Selena Pina LPN as Care Coordinator  [facility] [place] [facility] (Radiology)  Dixon Jonas MD (Surgery)  Kristina Hernandez FNP (Neurosurgery)       Subjective:     Review of Systems    12 point review of systems conducted, negative except as stated in the history of present illness. See HPI for details.      Objective:     Visit Vitals  /80 (BP Location: Left arm, Patient Position: Sitting)   Pulse 85   Temp 97.9 °F (36.6 °C) (Oral)   Resp 18   Ht 5' 2" (1.575 m)   Wt 80.5 kg (177 lb 6.4 oz)   LMP  (LMP Unknown)   SpO2 99%   BMI 32.45 kg/m²       Physical Exam    General: Alert and oriented, No acute distress.  Head: Normocephalic, Atraumatic.  Eye: Pupils are equal, round and reactive to light, Extraocular movements are intact, Sclera non-icteric.  Ears/Nose/Throat: Normal, Mucosa moist,Clear.  Neck/Thyroid: Supple, Non-tender, No carotid bruit, No lymphadenopathy, No JVD, Full range of motion.  Respiratory: Clear to auscultation bilaterally; No wheezes, rales or rhonchi,Non-labored respirations, Symmetrical chest wall expansion.  Cardiovascular: Regular rate and rhythm, S1/S2 normal, No murmurs, rubs or gallops.  Gastrointestinal: Soft, Non-tender, Non-distended, Normal bowel sounds, No palpable organomegaly.  Musculoskeletal: Normal range of motion.  Integumentary: Warm, Dry, Intact, No suspicious lesions or rashes.  Extremities: No clubbing, cyanosis or edema  Neurologic: No focal deficits, Cranial " Nerves II-XII are grossly intact, Motor strength normal upper and lower extremities, Sensory exam intact.  Psychiatric: Normal interaction, Coherent speech, Euthymic mood, Appropriate affect     Labs Reviewed:     Chemistry:  Lab Results   Component Value Date     06/17/2025    K 4.1 06/17/2025    BUN 17.1 06/17/2025    CREATININE 0.69 06/17/2025    EGFRNORACEVR >60 06/17/2025    CALCIUM 9.5 06/17/2025    ALKPHOS 98 06/17/2025    ALBUMIN 3.8 06/17/2025    BILIDIR 0.1 06/04/2021    IBILI 0.20 06/04/2021    AST 36 06/17/2025    ALT 33 06/17/2025    [id number] 66 06/17/2025    TSH 2.968 06/17/2025    [id number] 0.89 06/04/2018        Lab Results   Component Value Date    HGBA1C 5.0 06/17/2025        Hematology:  Lab Results   Component Value Date    WBC 7.96 06/17/2025    HGB 12.8 06/17/2025    HCT 39.3 06/17/2025     06/17/2025       Lipid Panel:  Lab Results   Component Value Date    CHOL 228 (H) 06/17/2025    HDL 50 06/17/2025    TRIG 195 (H) 06/17/2025    TOTALCHOLEST 5 06/17/2025        Urine:  Lab Results   Component Value Date    APPEARANCEUA Clear 07/28/2025    SGUA <=1.005 07/28/2025    PROTEINUA Negative 07/28/2025    KETONESUA Negative 07/28/2025    LEUKOCYTESUR Moderate (A) 07/28/2025    RBCUA 0-2 10/03/2023    WBCUA 11-20 (A) 10/03/2023    BACTERIA Few (A) 10/03/2023          Assessment/Plan     Assessment & Plan  Dysuria  Orders:    Urinalysis, Reflex to Urine Culture; Future    Class 1 obesity with body mass index (BMI) of 32.0 to 32.9 in adult, unspecified obesity type, unspecified whether serious comorbidity present  Body mass index is 32.45 kg/m².  Goal BMI <30.  Exercise 5 times a week for 30 minutes per day.  Avoid soda, simple sugars, excessive rice, potatoes or bread. Limit fast foods and fried foods.  Choose complex carbs in moderation (example: green vegetables, beans, oatmeal). Eat plenty of fresh fruits and vegetables with lean meats daily.  Do not skip meals. Eat a balanced  portion size.  Avoid fad diets. Consider permanent healthy life style changes.        No follow-ups on file. In addition to their scheduled follow up, the patient has also been instructed to follow up on as needed basis.     Future Appointments   Date Time Provider Department Center   7/28/2025  1:00 PM Flor Henderson FNP [facility]   8/13/2025  9:15 AM Dixon Chino MD [facility]   9/25/2025 10:30 AM Flor Henderson FNP [facility]   11/5/2025  1:00 PM Valentino Chery MD [facility]   6/30/2026 10:00 AM Flor Henderson FNP [facility]        ASLAZAR Coughlin

## 2025-07-30 ENCOUNTER — TELEPHONE (OUTPATIENT)
Dept: FAMILY MEDICINE | Facility: CLINIC | Age: 65
End: 2025-07-30
Payer: MEDICARE

## 2025-07-30 NOTE — TELEPHONE ENCOUNTER
Copied from CRM #[id number]. Topic: General Inquiry - Patient Advice  >> Jul 30, 2025 11:50 AM Tracy wrote:  Who Called: Charissa Dickerson    Caller is requesting assistance/information from provider's office.    Symptoms (please be specific):  n/a   How long has patient had these symptoms:  n/a  List of preferred pharmacies on file (remove unneeded): n/a    Preferred Method of Contact: Phone Call  Patient's Preferred Phone Number on File: [phone]   Best Call Back Number, if different: Jayla  [phone]  [phone]  Additional Information:  Jayla states she received an order for a Rolator but needs office notes stating the need for the Rolator

## 2025-08-13 DIAGNOSIS — K21.9 GASTROESOPHAGEAL REFLUX DISEASE, UNSPECIFIED WHETHER ESOPHAGITIS PRESENT: ICD-10-CM

## 2025-08-13 DIAGNOSIS — E03.9 HYPOTHYROIDISM, UNSPECIFIED TYPE: ICD-10-CM

## 2025-08-13 DIAGNOSIS — E78.00 HYPERCHOLESTEROLEMIA: ICD-10-CM

## 2025-08-13 DIAGNOSIS — F41.9 ANXIETY: ICD-10-CM

## 2025-08-14 RX ORDER — OMEPRAZOLE 20 MG/1
20 CAPSULE, DELAYED RELEASE ORAL
Qty: 90 CAPSULE | Refills: 3 | Status: SHIPPED | OUTPATIENT
Start: 2025-08-14

## 2025-08-14 RX ORDER — LEVOTHYROXINE SODIUM 50 UG/1
50 TABLET ORAL
Qty: 90 TABLET | Refills: 3 | Status: SHIPPED | OUTPATIENT
Start: 2025-08-14

## 2025-08-14 RX ORDER — LOVASTATIN 40 MG/1
40 TABLET ORAL NIGHTLY
Qty: 90 TABLET | Refills: 3 | Status: SHIPPED | OUTPATIENT
Start: 2025-08-14

## 2025-08-14 RX ORDER — VENLAFAXINE HYDROCHLORIDE 75 MG/1
75 CAPSULE, EXTENDED RELEASE ORAL
Qty: 90 CAPSULE | Refills: 3 | Status: SHIPPED | OUTPATIENT
Start: 2025-08-14

## (undated) DEVICE — CHLORAPREP 10.5 ML APPLICATOR

## (undated) DEVICE — NDL SYR 10ML 18X1.5 LL BLUNT

## (undated) DEVICE — NDL FLTR 5MCRN BLNT TIP 18GX1

## (undated) DEVICE — NDL HYPO REG 25G X 1 1/2

## (undated) DEVICE — GLOVE SIGNATURE MICRO LTX 6.5

## (undated) DEVICE — DRAPE UTILITY W/ TAPE 20X30IN

## (undated) DEVICE — NDL QUINCKE S/SU 22GA 5IN

## (undated) DEVICE — DRAPE MEDIUM SHEET 40X70IN

## (undated) DEVICE — SYR 3ML LL 18GA 1.5IN

## (undated) DEVICE — CONTRAST ISOVUE M 200 20ML VIL

## (undated) DEVICE — Device

## (undated) DEVICE — SET SMARTSITE EXT SMALLBORE NF

## (undated) DEVICE — ADAPTER DUAL NSL LUER M-M 7FT